# Patient Record
Sex: FEMALE | Race: WHITE | NOT HISPANIC OR LATINO | Employment: FULL TIME
[De-identification: names, ages, dates, MRNs, and addresses within clinical notes are randomized per-mention and may not be internally consistent; named-entity substitution may affect disease eponyms.]

---

## 2017-01-27 ENCOUNTER — HEALTH MAINTENANCE LETTER (OUTPATIENT)
Age: 62
End: 2017-01-27

## 2017-03-06 ENCOUNTER — TRANSFERRED RECORDS (OUTPATIENT)
Dept: FAMILY MEDICINE | Facility: CLINIC | Age: 62
End: 2017-03-06

## 2017-03-06 LAB
CHOLEST SERPL-MCNC: 186 MG/DL
HDLC SERPL-MCNC: 70 MG/DL
LDLC SERPL CALC-MCNC: 100 MG/DL
TRIGL SERPL-MCNC: 63 MG/DL

## 2017-03-16 ENCOUNTER — OFFICE VISIT (OUTPATIENT)
Dept: FAMILY MEDICINE | Facility: CLINIC | Age: 62
End: 2017-03-16

## 2017-03-16 VITALS
SYSTOLIC BLOOD PRESSURE: 120 MMHG | HEART RATE: 76 BPM | RESPIRATION RATE: 16 BRPM | WEIGHT: 191 LBS | DIASTOLIC BLOOD PRESSURE: 80 MMHG | TEMPERATURE: 98 F | BODY MASS INDEX: 29.98 KG/M2 | HEIGHT: 67 IN | OXYGEN SATURATION: 99 %

## 2017-03-16 DIAGNOSIS — Z71.89 ACP (ADVANCE CARE PLANNING): ICD-10-CM

## 2017-03-16 DIAGNOSIS — Z01.419 ENCOUNTER FOR GYNECOLOGICAL EXAMINATION WITHOUT ABNORMAL FINDING: ICD-10-CM

## 2017-03-16 LAB — HEMOGLOBIN: 14.2 G/DL (ref 11.7–15.7)

## 2017-03-16 PROCEDURE — 99396 PREV VISIT EST AGE 40-64: CPT | Performed by: FAMILY MEDICINE

## 2017-03-16 PROCEDURE — 85018 HEMOGLOBIN: CPT | Performed by: FAMILY MEDICINE

## 2017-03-16 PROCEDURE — 36415 COLL VENOUS BLD VENIPUNCTURE: CPT | Performed by: FAMILY MEDICINE

## 2017-03-16 PROCEDURE — 88142 CYTOPATH C/V THIN LAYER: CPT | Mod: 90 | Performed by: FAMILY MEDICINE

## 2017-03-16 NOTE — PATIENT INSTRUCTIONS
Total calcium intake of 1500 mgm/day, vitamin D 400-800IU/day and a regular weight bearing exercise program for prevention of osteoporosis is recommended treatment at this time.      HGB 14.2 ( nl 11.5-16.5)

## 2017-03-16 NOTE — NURSING NOTE
Patient is here for a full physical exam and pap.  Pre-Visit Screening :  Immunizations : up to date    Colonoscopy : is up to date  Mammogram : is up to date  Asthma Action Plan/Test : na  PHQ9/GAD7 : na  Pulse - regular  Medication Reconciliation: complete    BP done on the right arm, with a lg sized cuff.  Pulse - regular  My Chart - accepts    CLASSIFICATION OF OVERWEIGHT AND OBESITY BY BMI                         Obesity Class           BMI(kg/m2)  Underweight                                    < 18.5  Normal                                         18.5-24.9  Overweight                                     25.0-29.9  OBESITY                     I                  30.0-34.9                              II                 35.0-39.9  EXTREME OBESITY             III                >40                             Patient's  BMI Body mass index is 29.91 kg/(m^2).  http://hin.nhlbi.nih.gov/menuplanner/menu.cgi  Questioned patient about current smoking habits.  Pt. has never smoked.  ETOH screening:  Questions:  1-How often do you have a drink containing alcohol?                             1 times per week(s)  2-How many drinks containing alcohol do you have on a typical day when you are         Drinking?                              1   3- How often do you have 5 or more drinks on one occasion?                              never per never    Have you ever:  @None of the patient's responses to the CAGE screening were positive / Negative CAGE score@

## 2017-03-16 NOTE — MR AVS SNAPSHOT
After Visit Summary   3/16/2017    Paulette Grififn    MRN: [id number]           Patient Information     Date Of Birth          1955        Visit Information        Provider Department      3/16/2017 9:00 AM Dora Alas MD [facility]        Today's Diagnoses     Encounter for gynecological examination without abnormal finding        ACP (advance care planning)          Care Instructions    Total calcium intake of 1500 mgm/day, vitamin D 400-800IU/day and a regular weight bearing exercise program for prevention of osteoporosis is recommended treatment at this time.      HGB 14.2 ( nl 11.5-16.5)        Follow-ups after your visit        Follow-up notes from your care team     Return in about 1 year (around 3/16/2018), or if symptoms worsen or fail to improve.      Who to contact     If you have questions or need follow up information about today's clinic visit or your schedule please contact [facility] directly at [phone].  Normal or non-critical lab and imaging results will be communicated to you by Skyriderhart, letter or phone within 4 business days after the clinic has received the results. If you do not hear from us within 7 days, please contact the clinic through LetsCramt or phone. If you have a critical or abnormal lab result, we will notify you by phone as soon as possible.  Submit refill requests through "Showell - The Simple, Fast and Elegant Tablet Sales App" or call your pharmacy and they will forward the refill request to us. Please allow 3 business days for your refill to be completed.          Additional Information About Your Visit        MyChart Information     "Showell - The Simple, Fast and Elegant Tablet Sales App" gives you secure access to your electronic health record. If you see a primary care provider, you can also send messages to your care team and make appointments. If you have questions, please call your primary care clinic.  If you do not have a primary care provider, please call [phone] and they will assist  "you.        Care EveryWhere ID     This is your Care EveryWhere ID. This could be used by other organizations to access your [place] medical records  [id number]        Your Vitals Were     Pulse Temperature Respirations Height Pulse Oximetry BMI (Body Mass Index)    76 98  F (36.7  C) (Oral) 16 1.702 m (5' 7\") 99% 29.91 kg/m2       Blood Pressure from Last 3 Encounters:   03/16/17 120/80   11/20/14 110/70   10/10/12 130/86    Weight from Last 3 Encounters:   03/16/17 86.6 kg (191 lb)   11/20/14 86.2 kg (190 lb)   10/10/12 82.6 kg (182 lb)              We Performed the Following     CL AFF HEMOGLOBIN (BFP)     ThinPrep Pap and HPV (mRNA E6/E7){HPV-REFLEX} ([facility])     VENOUS COLLECTION        Primary Care Provider Office Phone # Fax #    Dora Alas [phone]       [facility] [address]  [facility] [address]        Thank you!     Thank you for choosing [facility]  for your care. Our goal is always to provide you with excellent care. Hearing back from our patients is one way we can continue to improve our services. Please take a few minutes to complete the written survey that you may receive in the mail after your visit with us. Thank you!             Your Updated Medication List - Protect others around you: Learn how to safely use, store and throw away your medicines at www.disposemymeds.org.      Notice  As of 3/16/2017  9:53 AM    You have not been prescribed any medications.      "

## 2017-03-16 NOTE — PROGRESS NOTES
"SUBJECTIVE:  Paulette Griffin is an 61 year old  postmenopausal woman   who presents for annual gyn exam. Menopause at age 57. No   bleeding, spotting, or discharge noted.     Estrogen replacement therapy: never  FROYLAN exposure: no  History of abnormal Pap smear: No  Family history of uterine or ovarian cancer: No  Regular self breast exam: Yes  History of abnormal mammogram: No  Family history of breast cancer: No  History of abnormal lipids: No    No past medical history on file.      Family History    Alzheimer Disease No family hx of     CANCER Maternal Aunt     Comment: bone    CANCER Paternal Aunt     Comment: primary site unknown    DIABETES Paternal Uncle     DIABETES Paternal Aunt     HEART DISEASE Father     Comment: cabg x 4         Past Surgical History    HC REMOVAL OF TONSILS,<11 Y/O      Comment Tonsillectomy, under 12 yrs       No current outpatient prescriptions on file.No current facility-administered medications for this visit.    -- No Known Allergies        Smoking status: Never Smoker    Smokeless tobacco: Never Used    Alcohol use Yes  0.6 oz/week    1 Glasses of wine per week            Review Of Systems  Ears/Nose/Throat: cold sores  Respiratory: No shortness of breath, dyspnea on exertion, cough, or hemoptysis  Cardiovascular: negative  Gastrointestinal: colonoscopy WNL  Genitourinary: negative    OBJECTIVE:  /80 (BP Location: Right arm, Patient Position: Chair, Cuff Size: Adult Large)  Pulse 76  Temp 98  F (36.7  C) (Oral)  Resp 16  Ht 1.702 m (5' 7\")  Wt 86.6 kg (191 lb)  SpO2 99%  BMI 29.91 kg/m2  General appearance: healthy, alert, no distress, cooperative and smiling  Skin: Skin color, texture, turgor normal. No rashes or lesions.  Ears: negative  Nose/Sinuses: Nares normal. Septum midline. Mucosa normal. No drainage or sinus tenderness.  Oropharynx: Lips, mucosa, and tongue normal. Teeth and gums normal.  Neck: Neck supple. No adenopathy. Thyroid symmetric, normal size,, " Carotids without bruits.  Lungs: negative, Percussion normal. Good diaphragmatic excursion. Lungs clear  Heart: negative, PMI normal. No lifts, heaves, or thrills. RRR. No murmurs, clicks gallops or rub  Breasts: Inspection negative. No nipple discharge or bleeding. No masses.  Abdomen: Abdomen soft, non-tender. BS normal. No masses, organomegaly, positive findings: obese  Pelvic: External genitalia and vagina normal. Bimanual and rectovaginal normal., positive findings:  vaginal mucosa atrophy  BMI : Body mass index is 29.91 kg/(m^2).    ASSESSMENT:(Z01.419) Encounter for gynecological examination without abnormal finding  Plan: ThinPrep Pap and HPV (mRNA E6/E7)         ([facility]), CL AFF HEMOGLOBIN (BFP), VENOUS         COLLECTION        Total calcium intake of 1500 mgm/day, vitamin D 400-800IU/day and a regular weight bearing exercise program for prevention of osteoporosis is recommended treatment at this time.      (Z71.89) ACP (advance care planning)  Plan: I have reviewed the patient's medical history in detail and updated the computerized patient record.          Dx:  1)  Pap smear, mammogram  2)  Lipids at appropriate intervals    PE:  Reviewed health maintenance including diet, regular exercise,   estrogen replacement and periodic exams.

## 2017-03-21 LAB
CLINICAL HISTORY - QUEST: NORMAL
CYTOTECHNOLOGIST - QUEST: NORMAL
DESCRIPTIVE DIAGNOSIS - QUEST: NORMAL
INFECTION - QUEST: NORMAL
LAST PAP DX - QUEST: NORMAL
LMP - QUEST: NORMAL
PREV BX DX - QUEST: NORMAL
SOURCE: NORMAL
STATEMENT OF ADEQUACY - QUEST: NORMAL

## 2019-12-08 ENCOUNTER — HEALTH MAINTENANCE LETTER (OUTPATIENT)
Age: 64
End: 2019-12-08

## 2020-01-10 ENCOUNTER — TRANSFERRED RECORDS (OUTPATIENT)
Dept: FAMILY MEDICINE | Facility: CLINIC | Age: 65
End: 2020-01-10

## 2020-01-10 LAB — MAMMOGRAM: NORMAL

## 2020-07-29 ENCOUNTER — TELEPHONE (OUTPATIENT)
Dept: FAMILY MEDICINE | Facility: CLINIC | Age: 65
End: 2020-07-29

## 2020-07-29 NOTE — TELEPHONE ENCOUNTER
Patient called in and left a message stating that she got a bee sting on Monday and the swelling and symptoms are starting to become worse now. She had iced it, taken benadryl and used hydrocortisone cream on it for the itching and everything is starting to become worse so she is wondering what she should do. I called her back and informed her that it might be best to come in for an office visit to have this taken look at because things should start to become better rather then worse with this. She expressed understanding and stated that the swelling is starting to spread to other parts as well so she does want to come in for a visit. I transferred her to  to get scheduled since it has been a couple years since she has been seen.

## 2021-01-09 ENCOUNTER — HEALTH MAINTENANCE LETTER (OUTPATIENT)
Age: 66
End: 2021-01-09

## 2021-02-11 ENCOUNTER — TRANSFERRED RECORDS (OUTPATIENT)
Dept: FAMILY MEDICINE | Facility: CLINIC | Age: 66
End: 2021-02-11

## 2021-02-11 LAB — MAMMOGRAM: NORMAL

## 2021-03-05 ENCOUNTER — OFFICE VISIT (OUTPATIENT)
Dept: FAMILY MEDICINE | Facility: CLINIC | Age: 66
End: 2021-03-05

## 2021-03-05 VITALS
DIASTOLIC BLOOD PRESSURE: 82 MMHG | SYSTOLIC BLOOD PRESSURE: 126 MMHG | OXYGEN SATURATION: 99 % | WEIGHT: 175.4 LBS | TEMPERATURE: 98.1 F | HEART RATE: 82 BPM | HEIGHT: 67 IN | BODY MASS INDEX: 27.53 KG/M2 | RESPIRATION RATE: 20 BRPM

## 2021-03-05 DIAGNOSIS — Z12.4 SCREENING FOR CERVICAL CANCER: ICD-10-CM

## 2021-03-05 DIAGNOSIS — Z01.419 ENCOUNTER FOR GYNECOLOGICAL EXAMINATION WITHOUT ABNORMAL FINDING: Primary | ICD-10-CM

## 2021-03-05 DIAGNOSIS — Z12.11 SPECIAL SCREENING FOR MALIGNANT NEOPLASMS, COLON: ICD-10-CM

## 2021-03-05 DIAGNOSIS — Z13.1 SCREENING FOR DIABETES MELLITUS: ICD-10-CM

## 2021-03-05 DIAGNOSIS — Z13.220 SCREENING CHOLESTEROL LEVEL: ICD-10-CM

## 2021-03-05 LAB
CHOLEST SERPL-MCNC: 216 MG/DL (ref 0–199)
CHOLEST/HDLC SERPL: 3 {RATIO} (ref 0–5)
GLUCOSE SERPL-MCNC: 81 MG/DL (ref 60–99)
HDLC SERPL-MCNC: 80 MG/DL (ref 40–150)
HEMOGLOBIN: 14.7 G/DL (ref 11.7–15.7)
LDLC SERPL CALC-MCNC: 124 MG/DL (ref 0–130)
TRIGL SERPL-MCNC: 62 MG/DL (ref 0–149)

## 2021-03-05 PROCEDURE — 36415 COLL VENOUS BLD VENIPUNCTURE: CPT | Performed by: FAMILY MEDICINE

## 2021-03-05 PROCEDURE — 88142 CYTOPATH C/V THIN LAYER: CPT | Mod: 90 | Performed by: FAMILY MEDICINE

## 2021-03-05 PROCEDURE — 99396 PREV VISIT EST AGE 40-64: CPT | Performed by: FAMILY MEDICINE

## 2021-03-05 PROCEDURE — 80061 LIPID PANEL: CPT | Performed by: FAMILY MEDICINE

## 2021-03-05 PROCEDURE — 85018 HEMOGLOBIN: CPT | Performed by: FAMILY MEDICINE

## 2021-03-05 PROCEDURE — 82947 ASSAY GLUCOSE BLOOD QUANT: CPT | Performed by: FAMILY MEDICINE

## 2021-03-05 RX ORDER — MULTIPLE VITAMINS W/ MINERALS TAB 9MG-400MCG
1 TAB ORAL DAILY
COMMUNITY

## 2021-03-05 SDOH — ECONOMIC STABILITY: TRANSPORTATION INSECURITY
IN THE PAST 12 MONTHS, HAS LACK OF TRANSPORTATION KEPT YOU FROM MEETINGS, WORK, OR FROM GETTING THINGS NEEDED FOR DAILY LIVING?: NO

## 2021-03-05 SDOH — ECONOMIC STABILITY: TRANSPORTATION INSECURITY
IN THE PAST 12 MONTHS, HAS THE LACK OF TRANSPORTATION KEPT YOU FROM MEDICAL APPOINTMENTS OR FROM GETTING MEDICATIONS?: NO

## 2021-03-05 SDOH — HEALTH STABILITY: MENTAL HEALTH: HOW OFTEN DO YOU HAVE A DRINK CONTAINING ALCOHOL?: 2-4 TIMES A MONTH

## 2021-03-05 SDOH — ECONOMIC STABILITY: FOOD INSECURITY: WITHIN THE PAST 12 MONTHS, THE FOOD YOU BOUGHT JUST DIDN'T LAST AND YOU DIDN'T HAVE MONEY TO GET MORE.: NEVER TRUE

## 2021-03-05 SDOH — ECONOMIC STABILITY: INCOME INSECURITY: HOW HARD IS IT FOR YOU TO PAY FOR THE VERY BASICS LIKE FOOD, HOUSING, MEDICAL CARE, AND HEATING?: NOT HARD AT ALL

## 2021-03-05 SDOH — HEALTH STABILITY: MENTAL HEALTH: HOW MANY STANDARD DRINKS CONTAINING ALCOHOL DO YOU HAVE ON A TYPICAL DAY?: 1 OR 2

## 2021-03-05 SDOH — HEALTH STABILITY: MENTAL HEALTH: HOW OFTEN DO YOU HAVE 6 OR MORE DRINKS ON ONE OCCASION?: NEVER

## 2021-03-05 SDOH — ECONOMIC STABILITY: FOOD INSECURITY: WITHIN THE PAST 12 MONTHS, YOU WORRIED THAT YOUR FOOD WOULD RUN OUT BEFORE YOU GOT MONEY TO BUY MORE.: NEVER TRUE

## 2021-03-05 ASSESSMENT — MIFFLIN-ST. JEOR: SCORE: 1383.24

## 2021-03-05 NOTE — PROGRESS NOTES
SUBJECTIVE:  Paulette Griffin is an 65 year old  postmenopausal woman   who presents for annual gyn exam. Menopause at age 57. No   bleeding, spotting, or discharge noted.     Estrogen replacement therapy: never  FROYLAN exposure: no  History of abnormal Pap smear: No  Family history of uterine or ovarian cancer: No  Regular self breast exam: Yes  History of abnormal mammogram: No  Family history of breast cancer: No  History of abnormal lipids: No    No past medical history on file.    Review of patient's family history indicates:  Problem: Alzheimer Disease      Relation: No family hx of          Age of Onset: (Not Specified)  Problem: Cancer      Relation: Maternal Aunt          Age of Onset: (Not Specified)          Comment: bone  Problem: Cancer      Relation: Paternal Aunt          Age of Onset: (Not Specified)          Comment: primary site unknown  Problem: Diabetes      Relation: Paternal Uncle          Age of Onset: (Not Specified)  Problem: Diabetes      Relation: Paternal Aunt          Age of Onset: (Not Specified)  Problem: Heart Disease      Relation: Father          Age of Onset: (Not Specified)          Comment: cabg x 4      Past Surgical History:   Procedure Laterality Date     HC REMOVAL OF TONSILS,<13 Y/O      Tonsillectomy, under 12 yrs     HYSTEROSCOPY  2015    polyp         Current Outpatient Medications:   multivitamin w/minerals (MULTI-VITAMIN) tablet    No current facility-administered medications for this visit.      -- No Known Allergies     Social History    Tobacco Use      Smoking status: Former Smoker      Smokeless tobacco: Never Used      Tobacco comment: teenager    Alcohol use: Yes      Alcohol/week: 1.0 standard drinks      Types: 1 Glasses of wine per week      Frequency: 2-4 times a month      Drinks per session: 1 or 2      Binge frequency: Never      Review Of Systems  Ears/Nose/Throat: cold sores  Respiratory: No shortness of breath, dyspnea on exertion, cough, or  "hemoptysis  Cardiovascular: negative  Gastrointestinal: 2009 colonoscopy/ due  Genitourinary: negative    OBJECTIVE:  /82 (BP Location: Right arm, Patient Position: Sitting, Cuff Size: Adult Large)   Pulse 82   Temp 98.1  F (36.7  C) (Oral)   Ht 1.702 m (5' 7\")   Wt 79.6 kg (175 lb 6.4 oz)   LMP 08/31/2012   SpO2 99%   BMI 27.47 kg/m    General appearance: healthy, alert, no distress, cooperative and smiling  Skin: Skin color, texture, turgor normal. No rashes or lesions.  Ears: negative  Nose/Sinuses: Nares normal. Septum midline. Mucosa normal. No drainage or sinus tenderness.  Oropharynx: Lips, mucosa, and tongue normal. Teeth and gums normal.  Neck: Neck supple. No adenopathy. Thyroid symmetric, normal size,, Carotids without bruits.  Lungs: negative, Percussion normal. Good diaphragmatic excursion. Lungs clear  Heart: negative, PMI normal. No lifts, heaves, or thrills. RRR. No murmurs, clicks gallops or rub  Breasts: Inspection negative. No nipple discharge or bleeding. No masses.  Abdomen: Abdomen soft, non-tender. BS normal. No masses, organomegaly  Pelvic: External genitalia and vagina normal. Bimanual and rectovaginal normal., positive findings:  vaginal mucosa atrophy, cervical polyp  BMI : Body mass index is 27.47 kg/m .    ASSESSMENT:  (Z01.419) Encounter for gynecological examination without abnormal finding  (primary encounter diagnosis)  Plan: ThinPrep Pap and HPV (mRNA E6/E7)         ([facility]), HEMOGLOBIN (BFP), VENOUS COLLECTION        Colonoscopy recommended  Exercise encouraged  Fasting lipid profile obtained  Follow up in 1 year  Glucose obtained  Hemoglobin obtained  PAP smear obtained  Routine breast self-exam encouraged  Seat belt use encouraged  Sunscreen recommended  Shingles vaccine      (Z12.4) Screening for cervical cancer  Plan: ThinPrep Pap and HPV (mRNA E6/E7)         ([facility])            (Z13.1) Screening for diabetes mellitus  Plan: Glucose Fasting (BFP), VENOUS COLLECTION   "          (Z13.220) Screening cholesterol level:   Plan: Lipid Panel (BFP), VENOUS COLLECTION           (Z12.11) Special screening for malignant neoplasms, colon  Plan: GASTROENTEROLOGY ADULT REF PROCEDURE ONLY,         CANCELED: GASTROENTEROLOGY ADULT REF PROCEDURE         ONLY              Dx:  1)  Pap smear, mammogram  2)  Lipids at appropriate intervals    PE:  Reviewed health maintenance including diet, regular exercise,   estrogen replacement and periodic exams.

## 2021-03-05 NOTE — PATIENT INSTRUCTIONS
Colonoscopy recommended  Exercise encouraged  Fasting lipid profile obtained  Follow up in 1 year  Glucose obtained  Hemoglobin obtained  PAP smear obtained  Routine breast self-exam encouraged  Seat belt use encouraged  Sunscreen recommended  Shingles vaccine

## 2021-03-05 NOTE — NURSING NOTE
Patient is here for a full physical exam.    Pre-Visit Screening :  Immunizations : not up to date- shingrix at pharmacy  Colon Screening : is up to date/ cologaurd done last year  Mammogram: UTD  Asthma Action Test/Plan : NA  PHQ9 :  NA  GAD7 :  NA  Patient's  BMI Body mass index is 27.47 kg/m .  Questioned patient about current smoking habits.  Pt. has smoked years ago.  OK to leave a detailed voice message regarding today's visit Yes, phone # [phone]      ETOH screening: addressed in history

## 2021-03-10 LAB
CLINICAL HISTORY - QUEST: NORMAL
COMMENT - QUEST: NORMAL
CYTOTECHNOLOGIST - QUEST: NORMAL
DESCRIPTIVE DIAGNOSIS - QUEST: NORMAL
LAST PAP DX - QUEST: NORMAL
LMP - QUEST: NORMAL
PREV BX DX - QUEST: NORMAL
SOURCE: NORMAL
STATEMENT OF ADEQUACY - QUEST: NORMAL

## 2021-04-05 ENCOUNTER — IMMUNIZATION (OUTPATIENT)
Dept: NURSING | Facility: CLINIC | Age: 66
End: 2021-04-05
Payer: COMMERCIAL

## 2021-04-05 PROCEDURE — 0001A PR COVID VAC PFIZER DIL RECON 30 MCG/0.3 ML IM: CPT

## 2021-04-05 PROCEDURE — 91300 PR COVID VAC PFIZER DIL RECON 30 MCG/0.3 ML IM: CPT

## 2021-04-26 ENCOUNTER — IMMUNIZATION (OUTPATIENT)
Dept: NURSING | Facility: CLINIC | Age: 66
End: 2021-04-26
Attending: INTERNAL MEDICINE
Payer: COMMERCIAL

## 2021-04-26 PROCEDURE — 0002A PR COVID VAC PFIZER DIL RECON 30 MCG/0.3 ML IM: CPT

## 2021-04-26 PROCEDURE — 91300 PR COVID VAC PFIZER DIL RECON 30 MCG/0.3 ML IM: CPT

## 2021-07-15 ENCOUNTER — TRANSFERRED RECORDS (OUTPATIENT)
Dept: FAMILY MEDICINE | Facility: CLINIC | Age: 66
End: 2021-07-15

## 2021-09-10 ENCOUNTER — MYC MEDICAL ADVICE (OUTPATIENT)
Dept: FAMILY MEDICINE | Facility: CLINIC | Age: 66
End: 2021-09-10

## 2021-10-23 ENCOUNTER — HEALTH MAINTENANCE LETTER (OUTPATIENT)
Age: 66
End: 2021-10-23

## 2021-11-02 ENCOUNTER — OFFICE VISIT (OUTPATIENT)
Dept: FAMILY MEDICINE | Facility: CLINIC | Age: 66
End: 2021-11-02

## 2021-11-02 VITALS
HEIGHT: 67 IN | OXYGEN SATURATION: 98 % | SYSTOLIC BLOOD PRESSURE: 130 MMHG | DIASTOLIC BLOOD PRESSURE: 82 MMHG | BODY MASS INDEX: 29.29 KG/M2 | TEMPERATURE: 98.5 F | HEART RATE: 71 BPM | WEIGHT: 186.6 LBS

## 2021-11-02 DIAGNOSIS — Z83.2 FAMILY HISTORY OF CLOTTING DISORDER: ICD-10-CM

## 2021-11-02 DIAGNOSIS — Z01.818 PREOPERATIVE EXAMINATION: ICD-10-CM

## 2021-11-02 DIAGNOSIS — R94.31 ABNORMAL ELECTROCARDIOGRAM: ICD-10-CM

## 2021-11-02 DIAGNOSIS — M25.552 HIP PAIN, LEFT: Primary | ICD-10-CM

## 2021-11-02 LAB
% GRANULOCYTES: 62.7 %
BUN SERPL-MCNC: 15 MG/DL (ref 7–25)
BUN/CREATININE RATIO: 20 (ref 6–22)
CALCIUM SERPL-MCNC: 10 MG/DL (ref 8.6–10.3)
CHLORIDE SERPLBLD-SCNC: 100.6 MMOL/L (ref 98–110)
CO2 SERPL-SCNC: 31.4 MMOL/L (ref 20–32)
CREAT SERPL-MCNC: 0.75 MG/DL (ref 0.6–1.3)
GLUCOSE SERPL-MCNC: 97 MG/DL (ref 60–99)
HCT VFR BLD AUTO: 44.6 % (ref 35–47)
HEMOGLOBIN: 14.8 G/DL (ref 11.7–15.7)
LYMPHOCYTES NFR BLD AUTO: 30.1 %
MCH RBC QN AUTO: 33 PG (ref 26–33)
MCHC RBC AUTO-ENTMCNC: 33.2 G/DL (ref 31–36)
MCV RBC AUTO: 99.5 FL (ref 78–100)
MONOCYTES NFR BLD AUTO: 7.2 %
PLATELET COUNT - QUEST: 325 10^9/L (ref 150–375)
POTASSIUM SERPL-SCNC: 4.44 MMOL/L (ref 3.5–5.3)
RBC # BLD AUTO: 4.48 10*12/L (ref 3.8–5.2)
SODIUM SERPL-SCNC: 137.7 MMOL/L (ref 135–146)
WBC # BLD AUTO: 6.4 10*9/L (ref 4–11)

## 2021-11-02 PROCEDURE — 85025 COMPLETE CBC W/AUTO DIFF WBC: CPT | Performed by: FAMILY MEDICINE

## 2021-11-02 PROCEDURE — 99215 OFFICE O/P EST HI 40 MIN: CPT | Mod: 25 | Performed by: FAMILY MEDICINE

## 2021-11-02 PROCEDURE — 93000 ELECTROCARDIOGRAM COMPLETE: CPT | Performed by: FAMILY MEDICINE

## 2021-11-02 PROCEDURE — 36415 COLL VENOUS BLD VENIPUNCTURE: CPT | Performed by: FAMILY MEDICINE

## 2021-11-02 PROCEDURE — 80048 BASIC METABOLIC PNL TOTAL CA: CPT | Performed by: FAMILY MEDICINE

## 2021-11-02 ASSESSMENT — MIFFLIN-ST. JEOR: SCORE: 1429.04

## 2021-11-02 NOTE — NURSING NOTE
Chief Complaint   Patient presents with     Pre-Op Exam     left hip replacement on 11/23     Pre-visit Screening:  Immunizations:  up to date  Colonoscopy:  is up to date  Mammogram: is up to date  Asthma Action Test/Plan:  NA  PHQ9:  NA  GAD7:  NA  Questioned patient about current smoking habits Pt. quit smoking some time ago.  Ok to leave detailed message on voice mail for today's visit only Yes, phone # [phone]

## 2021-11-03 ENCOUNTER — DOCUMENTATION ONLY (OUTPATIENT)
Dept: CARDIOLOGY | Facility: CLINIC | Age: 66
End: 2021-11-03

## 2021-11-03 NOTE — PROGRESS NOTES
Phone consult for abnormal ECG by Dr. Gracia. Pt is having pre-op evaluation prior to hip surgery.   I reviewed ECG. SR with LBBB. This is new. No previous ECG in record.  Multiple cardiac risk factors: smoking history, elevated cholesterol, family history of CAD.  I recommended Lexiscan nuclear perfusion study for further evaluation and cardiology consult if abnormal.     Cristofer Ortega MD

## 2021-11-05 ENCOUNTER — HOSPITAL ENCOUNTER (OUTPATIENT)
Dept: NUCLEAR MEDICINE | Facility: CLINIC | Age: 66
Setting detail: NUCLEAR MEDICINE
End: 2021-11-05
Attending: FAMILY MEDICINE
Payer: COMMERCIAL

## 2021-11-05 ENCOUNTER — HOSPITAL ENCOUNTER (OUTPATIENT)
Dept: CARDIOLOGY | Facility: CLINIC | Age: 66
End: 2021-11-05
Attending: FAMILY MEDICINE
Payer: COMMERCIAL

## 2021-11-05 DIAGNOSIS — M25.552 HIP PAIN, LEFT: ICD-10-CM

## 2021-11-05 DIAGNOSIS — Z01.818 PREOPERATIVE EXAMINATION: ICD-10-CM

## 2021-11-05 DIAGNOSIS — R94.31 ABNORMAL ELECTROCARDIOGRAM: ICD-10-CM

## 2021-11-05 LAB
STRESS ECHO BASELINE DIASTOLIC HE: 99
STRESS ECHO BASELINE HR: 72 BPM
STRESS ECHO BASELINE SYSTOLIC BP: 172
STRESS ECHO TARGET HR: 156
STRESS/REST PERFUSION RATIO: 1.03

## 2021-11-05 PROCEDURE — A9502 TC99M TETROFOSMIN: HCPCS | Performed by: FAMILY MEDICINE

## 2021-11-05 PROCEDURE — 250N000011 HC RX IP 250 OP 636

## 2021-11-05 PROCEDURE — 343N000001 HC RX 343: Performed by: FAMILY MEDICINE

## 2021-11-05 PROCEDURE — 93018 CV STRESS TEST I&R ONLY: CPT | Performed by: INTERNAL MEDICINE

## 2021-11-05 PROCEDURE — 93016 CV STRESS TEST SUPVJ ONLY: CPT | Performed by: INTERNAL MEDICINE

## 2021-11-05 PROCEDURE — 78452 HT MUSCLE IMAGE SPECT MULT: CPT | Mod: 26 | Performed by: INTERNAL MEDICINE

## 2021-11-05 PROCEDURE — 78452 HT MUSCLE IMAGE SPECT MULT: CPT

## 2021-11-05 PROCEDURE — 93017 CV STRESS TEST TRACING ONLY: CPT

## 2021-11-05 RX ORDER — REGADENOSON 0.08 MG/ML
0.4 INJECTION, SOLUTION INTRAVENOUS ONCE
Status: COMPLETED | OUTPATIENT
Start: 2021-11-05 | End: 2021-11-05

## 2021-11-05 RX ORDER — REGADENOSON 0.08 MG/ML
INJECTION, SOLUTION INTRAVENOUS
Status: COMPLETED
Start: 2021-11-05 | End: 2021-11-05

## 2021-11-05 RX ORDER — ACYCLOVIR 200 MG/1
0-1 CAPSULE ORAL
Status: DISCONTINUED | OUTPATIENT
Start: 2021-11-05 | End: 2021-11-06 | Stop reason: HOSPADM

## 2021-11-05 RX ORDER — ALBUTEROL SULFATE 90 UG/1
2 AEROSOL, METERED RESPIRATORY (INHALATION) EVERY 5 MIN PRN
Status: DISCONTINUED | OUTPATIENT
Start: 2021-11-05 | End: 2021-11-06 | Stop reason: HOSPADM

## 2021-11-05 RX ORDER — CAFFEINE CITRATE 20 MG/ML
60 SOLUTION INTRAVENOUS
Status: DISCONTINUED | OUTPATIENT
Start: 2021-11-05 | End: 2021-11-06 | Stop reason: HOSPADM

## 2021-11-05 RX ORDER — AMINOPHYLLINE 25 MG/ML
INJECTION, SOLUTION INTRAVENOUS
Status: DISCONTINUED
Start: 2021-11-05 | End: 2021-11-05 | Stop reason: WASHOUT

## 2021-11-05 RX ORDER — AMINOPHYLLINE 25 MG/ML
50-100 INJECTION, SOLUTION INTRAVENOUS
Status: DISCONTINUED | OUTPATIENT
Start: 2021-11-05 | End: 2021-11-06 | Stop reason: HOSPADM

## 2021-11-05 RX ADMIN — REGADENOSON 0.4 MG: 0.08 INJECTION, SOLUTION INTRAVENOUS at 14:31

## 2021-11-05 RX ADMIN — TETROFOSMIN 10.3 MCI.: 1.38 INJECTION, POWDER, LYOPHILIZED, FOR SOLUTION INTRAVENOUS at 13:03

## 2021-11-05 RX ADMIN — TETROFOSMIN 31 MCI.: 1.38 INJECTION, POWDER, LYOPHILIZED, FOR SOLUTION INTRAVENOUS at 14:23

## 2021-11-08 ENCOUNTER — TELEPHONE (OUTPATIENT)
Dept: FAMILY MEDICINE | Facility: CLINIC | Age: 66
End: 2021-11-08

## 2021-11-08 ENCOUNTER — TRANSFERRED RECORDS (OUTPATIENT)
Dept: FAMILY MEDICINE | Facility: CLINIC | Age: 66
End: 2021-11-08

## 2021-11-08 NOTE — TELEPHONE ENCOUNTER
I left a message for patient with referral information regarding Cardiology Consult Referral. I left info for patient to call to make her appointment. Will also send a MY CHART message with info

## 2021-11-08 NOTE — TELEPHONE ENCOUNTER
Patient called back with her cardiology appointment.     11/16/2021 @ 8:00am with     Dr. Sarah Alford   [facility]   [facility]  [address]  [place], [place] [address] [phone] -- appt line  [phone] -- fax    Patient asked if orthopedic has been informed of appointments. I advised her to call orthopedic to give update/heads up so they are up to date with status.     BETH

## 2021-11-16 ENCOUNTER — OFFICE VISIT (OUTPATIENT)
Dept: CARDIOLOGY | Facility: CLINIC | Age: 66
End: 2021-11-16
Attending: FAMILY MEDICINE
Payer: COMMERCIAL

## 2021-11-16 VITALS
HEART RATE: 89 BPM | WEIGHT: 184.5 LBS | OXYGEN SATURATION: 99 % | DIASTOLIC BLOOD PRESSURE: 88 MMHG | BODY MASS INDEX: 28.96 KG/M2 | SYSTOLIC BLOOD PRESSURE: 144 MMHG | HEIGHT: 67 IN

## 2021-11-16 DIAGNOSIS — Z01.818 PREOPERATIVE EXAMINATION: ICD-10-CM

## 2021-11-16 DIAGNOSIS — R94.31 ABNORMAL ELECTROCARDIOGRAM: ICD-10-CM

## 2021-11-16 PROCEDURE — 99204 OFFICE O/P NEW MOD 45 MIN: CPT | Performed by: INTERNAL MEDICINE

## 2021-11-16 RX ORDER — ACETAMINOPHEN 500 MG
500-1000 TABLET ORAL EVERY 6 HOURS PRN
COMMUNITY

## 2021-11-16 ASSESSMENT — MIFFLIN-ST. JEOR: SCORE: 1419.52

## 2021-11-16 NOTE — PATIENT INSTRUCTIONS
1. Take aspirin post-op as per hematology recommendations.  2. No additional cardiovascular testing recommended.  3. Proceed with left hip arthoplasty as planned  4. Follow up approximately one month post-op with Anyi Gracia to evaluate blood pressure.

## 2021-11-16 NOTE — PROGRESS NOTES
CARDIOLOGY CLINIC CONSULT    REASON FOR CONSULT: [facility]    PRIMARY CARE PHYSICIAN:  Anyi Gracia    HISTORY OF PRESENT ILLNESS:  Paulette Griffin is a very nice 66-year-old woman here for preoperative cardiovascular evaluation to review her stress test and EKG results prior to planned left hip arthroplasty next week.  She has no personal history of heart attack or stroke and is not typically hypertensive.    She is typically very active and walks 5 miles per day up until about a month ago. Over the past 30 days the hip pain has really worsened and she can't do her usual 30-45 min per day of aerobic exercise.     Her preoperative physical EKG was notable for a left bundle branch block.  She is asymptomatic and has no history of abnormal chest pain, dyspnea, palpitations or edema.    Ivory was very anxious and actually tearful at the beginning of her visit today.  She has very limiting hip pain and was worried about the need for cardiovascular testing.  We reviewed her tests in detail.  Her stress test result is reassuring with a distal abnormality consistent with left bundle branch block and probably no ischemia.     She was referred to oncology due to history of blood clots in her family.  Her mother and her daughter both had blood clots while on birth control.  She was advised to take aspirin after the hip arthroplasty.    Her blood pressure was elevated today and when she presented for her stress test.  She states that typically her blood pressure runs 120s to 130s over 60s to 70s.  I reviewed her chart and indeed her blood pressure does not typically run in the hypertensive range where we would recommend medication therapy.  Her blood pressure was improved at the end of our visit today.    PAST MEDICAL HISTORY:  Left hip arthritis    MEDICATIONS:  Current Outpatient Medications   Medication     acetaminophen (TYLENOL) 500 MG tablet     Homeopathic Products (AVOCADO REVITALIZING EX)     MELATONIN PO      multivitamin w/minerals (MULTI-VITAMIN) tablet     TURMERIC PO     Naproxen Sodium (ALEVE PO)     No current facility-administered medications for this visit.       ALLERGIES:  Allergies   Allergen Reactions     No Known Allergies        SOCIAL HISTORY:  Quit smoking almost 40 yrs ago  No heavy EtOH    FAMILY HISTORY:  Her mother and daughter both had blood clots when they were on birth control.   Her dad has a heart history, he had CABG in his late 50s and is still living at age 86      REVIEW OF SYSTEMS:  Skin:  Negative     Eyes:  Negative    ENT:  Negative    Respiratory:  Negative    Cardiovascular:  Negative    Gastroenterology: Negative    Genitourinary:  Negative    Musculoskeletal:  Positive for joint pain  Neurologic:  Negative    Psychiatric:  Negative    Heme/Lymph/Imm:  Negative    Endocrine:  Negative      PHYSICAL EXAM:      BP: (!) 144/88 Pulse: 89     SpO2: 99 %      Vital Signs with Ranges  Pulse:  [89] 89  BP: (160)/(104) 160/104  SpO2:  [99 %] 99 %  184 lbs 8 oz    Constitutional: awake, alert, no distress  Eyes: sclera nonicteric  ENT: trachea midline  Respiratory: Clear to auscultation bilaterally  Cardiovascular: Regular rate and rhythm no murmur rub or gallop  GI: nondistended, nontender, bowel sounds present  Skin: dry, no rash no edema  Musculoskeletal: grossly normal muscle bulk and tone  Neuropsychiatric: appropriate affect      DATA:   LAST CHOLESTEROL:  Lab Results   Component Value Date    CHOL 216 03/05/2021     Lab Results   Component Value Date    HDL 80 03/05/2021     Lab Results   Component Value Date     03/05/2021     03/06/2017     Lab Results   Component Value Date    TRIG 62 03/05/2021     Lab Results   Component Value Date    CHOLHDLRATIO 3 03/05/2021       LAST BMP:  Lab Results   Component Value Date    .7 11/02/2021      Lab Results   Component Value Date    POTASSIUM 4.44 11/02/2021     Lab Results   Component Value Date    CHLORIDE 100.6 11/02/2021  "    Lab Results   Component Value Date    RODRÍGUEZ 10.0 11/02/2021     Lab Results   Component Value Date    CO2 31.4 11/02/2021     Lab Results   Component Value Date    BUN 15 11/02/2021    BUN 20.0 11/02/2021     Lab Results   Component Value Date    CR 0.75 11/02/2021     Lab Results   Component Value Date    GLC 97 11/02/2021       LAST CBC:  Lab Results   Component Value Date    WBC 6.4 11/02/2021     Lab Results   Component Value Date    RBC 4.48 11/02/2021     Lab Results   Component Value Date    HGB 14.8 11/02/2021     Lab Results   Component Value Date    HCT 44.6 11/02/2021     Lab Results   Component Value Date    MCV 99.5 11/02/2021     Lab Results   Component Value Date    MCH 33.0 11/02/2021     Lab Results   Component Value Date    MCHC 33.2 11/02/2021     No results found for: RDW  Lab Results   Component Value Date     11/02/2021         EKG 1/2/21 sinus w/lbbb    lexiscan stress test 11/5/21     The nuclear stress test is probably negative for inducible myocardial ischemia or infarction.     Nuclear Study Quality: Small, distal anteroseptal defect, suspect this is secondary to left bundle branch block.  Less likely would be small area of infarct.     The left ventricular ejection fraction at stress is greater than 70%.     There is no prior study for comparison.        ASSESSMENT:  1. Left bundle branch block.  2. Plan for left hip arthroplasty as planned.  She has low estimated cardiac risk for major adverse event for a moderate risk non-cardiac surgery.   3. Stress test negative for inducible ischemia (\"probably\" negative with lower level of certainty due to underlying LBBB).  4. Elevated blood pressure without history of hypertension  5. Family history of possible clotting disorder.    RECOMMENDATIONS:  1. She will take aspirin post-op per hematology  2. No additional cardiovascular testing recommended prior to surgery.  3. Proceed with left hip arthoplasty as planned  4. Follow up " approximately one month post-op with Anyi Gracia to evaluate blood pressure.  I do not recommend initiation of an antihypertensive agent/ beta-blocker at this time in the preoperative.  In the context of elevated blood pressure without a diagnosis of hypertension and with significant ongoing hip pain.  5. Continue routine primary care postoperatively.  Cardiology follow-up as needed.    Sarah Alford MD [facility]  Text Page

## 2021-11-16 NOTE — TELEPHONE ENCOUNTER
Patient called to let us know that she saw Cardiology this morning and was approved for surgery.     Dr. Gracia please review the cardiology note in Epic from today. Patient asked if you could call  [facility]/Dr. Mike so he knows that she is ok for surgery today.   Patient said that you can reach her @ [phone] with any questions.     Thank you

## 2021-11-16 NOTE — LETTER
11/16/2021    Anyi Gracia MD  [address]  [facility] [address]    RE: Paulette MARTINS Gaby       Dear Colleague,    I had the pleasure of seeing Paulette Griffin in the [facility].  CARDIOLOGY CLINIC CONSULT    REASON FOR CONSULT: [facility]    PRIMARY CARE PHYSICIAN:  Anyi Gracia    HISTORY OF PRESENT ILLNESS:  Paulette Griffin is a very nice 66-year-old woman here for preoperative cardiovascular evaluation to review her stress test and EKG results prior to planned left hip arthroplasty next week.  She has no personal history of heart attack or stroke and is not typically hypertensive.    She is typically very active and walks 5 miles per day up until about a month ago. Over the past 30 days the hip pain has really worsened and she can't do her usual 30-45 min per day of aerobic exercise.     Her preoperative physical EKG was notable for a left bundle branch block.  She is asymptomatic and has no history of abnormal chest pain, dyspnea, palpitations or edema.    Ivory was very anxious and actually tearful at the beginning of her visit today.  She has very limiting hip pain and was worried about the need for cardiovascular testing.  We reviewed her tests in detail.  Her stress test result is reassuring with a distal abnormality consistent with left bundle branch block and probably no ischemia.     She was referred to oncology due to history of blood clots in her family.  Her mother and her daughter both had blood clots while on birth control.  She was advised to take aspirin after the hip arthroplasty.    Her blood pressure was elevated today and when she presented for her stress test.  She states that typically her blood pressure runs 120s to 130s over 60s to 70s.  I reviewed her chart and indeed her blood pressure does not typically run in the hypertensive range where we would recommend medication therapy.  Her blood pressure was improved at the end of  our visit today.    PAST MEDICAL HISTORY:  Left hip arthritis    MEDICATIONS:  Current Outpatient Medications   Medication     acetaminophen (TYLENOL) 500 MG tablet     Homeopathic Products (AVOCADO REVITALIZING EX)     MELATONIN PO     multivitamin w/minerals (MULTI-VITAMIN) tablet     TURMERIC PO     Naproxen Sodium (ALEVE PO)     No current facility-administered medications for this visit.       ALLERGIES:  Allergies   Allergen Reactions     No Known Allergies        SOCIAL HISTORY:  Quit smoking almost 40 yrs ago  No heavy EtOH    FAMILY HISTORY:  Her mother and daughter both had blood clots when they were on birth control.   Her dad has a heart history, he had CABG in his late 50s and is still living at age 86      REVIEW OF SYSTEMS:  Skin:  Negative     Eyes:  Negative    ENT:  Negative    Respiratory:  Negative    Cardiovascular:  Negative    Gastroenterology: Negative    Genitourinary:  Negative    Musculoskeletal:  Positive for joint pain  Neurologic:  Negative    Psychiatric:  Negative    Heme/Lymph/Imm:  Negative    Endocrine:  Negative      PHYSICAL EXAM:      BP: (!) 144/88 Pulse: 89     SpO2: 99 %      Vital Signs with Ranges  Pulse:  [89] 89  BP: (160)/(104) 160/104  SpO2:  [99 %] 99 %  184 lbs 8 oz    Constitutional: awake, alert, no distress  Eyes: sclera nonicteric  ENT: trachea midline  Respiratory: Clear to auscultation bilaterally  Cardiovascular: Regular rate and rhythm no murmur rub or gallop  GI: nondistended, nontender, bowel sounds present  Skin: dry, no rash no edema  Musculoskeletal: grossly normal muscle bulk and tone  Neuropsychiatric: appropriate affect      DATA:   LAST CHOLESTEROL:  Lab Results   Component Value Date    CHOL 216 03/05/2021     Lab Results   Component Value Date    HDL 80 03/05/2021     Lab Results   Component Value Date     03/05/2021     03/06/2017     Lab Results   Component Value Date    TRIG 62 03/05/2021     Lab Results   Component Value Date     "CHOLHDLRATIO 3 03/05/2021       LAST BMP:  Lab Results   Component Value Date    .7 11/02/2021      Lab Results   Component Value Date    POTASSIUM 4.44 11/02/2021     Lab Results   Component Value Date    CHLORIDE 100.6 11/02/2021     Lab Results   Component Value Date    RODRÍGUEZ 10.0 11/02/2021     Lab Results   Component Value Date    CO2 31.4 11/02/2021     Lab Results   Component Value Date    BUN 15 11/02/2021    BUN 20.0 11/02/2021     Lab Results   Component Value Date    CR 0.75 11/02/2021     Lab Results   Component Value Date    GLC 97 11/02/2021       LAST CBC:  Lab Results   Component Value Date    WBC 6.4 11/02/2021     Lab Results   Component Value Date    RBC 4.48 11/02/2021     Lab Results   Component Value Date    HGB 14.8 11/02/2021     Lab Results   Component Value Date    HCT 44.6 11/02/2021     Lab Results   Component Value Date    MCV 99.5 11/02/2021     Lab Results   Component Value Date    MCH 33.0 11/02/2021     Lab Results   Component Value Date    MCHC 33.2 11/02/2021     No results found for: RDW  Lab Results   Component Value Date     11/02/2021         EKG 1/2/21 sinus w/lbbb    lexiscan stress test 11/5/21     The nuclear stress test is probably negative for inducible myocardial ischemia or infarction.     Nuclear Study Quality: Small, distal anteroseptal defect, suspect this is secondary to left bundle branch block.  Less likely would be small area of infarct.     The left ventricular ejection fraction at stress is greater than 70%.     There is no prior study for comparison.        ASSESSMENT:  1. Left bundle branch block.  2. Plan for left hip arthroplasty as planned.  She has low estimated cardiac risk for major adverse event for a moderate risk non-cardiac surgery.   3. Stress test negative for inducible ischemia (\"probably\" negative with lower level of certainty due to underlying LBBB).  4. Elevated blood pressure without history of hypertension  5. Family history of " possible clotting disorder.    RECOMMENDATIONS:  1. She will take aspirin post-op per hematology  2. No additional cardiovascular testing recommended prior to surgery.  3. Proceed with left hip arthoplasty as planned  4. Follow up approximately one month post-op with Anyi Gracia to evaluate blood pressure.  I do not recommend initiation of an antihypertensive agent/ beta-blocker at this time in the preoperative.  In the context of elevated blood pressure without a diagnosis of hypertension and with significant ongoing hip pain.  5. Continue routine primary care postoperatively.  Cardiology follow-up as needed.      Sarah Alford MD [facility]  Text Page

## 2021-11-16 NOTE — TELEPHONE ENCOUNTER
Pre op note, EKG and lexiscan sent to Sahra with [facility].  I left a message for patient that the information was sent to Sahra with [facility]

## 2021-11-16 NOTE — TELEPHONE ENCOUNTER
Sahra, Care Coordinator with [facility] called asking that we send her the Pre Op office note when closed.     Dr. Gracia please let me know when you have closed the 11/2/2021 office note so I can send to Sahra Bess with [facility]. Sahra will get the note to the surgery Center also    Thank you

## 2022-02-18 ENCOUNTER — TRANSFERRED RECORDS (OUTPATIENT)
Dept: FAMILY MEDICINE | Facility: CLINIC | Age: 67
End: 2022-02-18

## 2022-02-18 LAB — MAMMOGRAM: NORMAL

## 2022-04-09 ENCOUNTER — HEALTH MAINTENANCE LETTER (OUTPATIENT)
Age: 67
End: 2022-04-09

## 2022-07-30 ENCOUNTER — HEALTH MAINTENANCE LETTER (OUTPATIENT)
Age: 67
End: 2022-07-30

## 2022-10-09 ENCOUNTER — HEALTH MAINTENANCE LETTER (OUTPATIENT)
Age: 67
End: 2022-10-09

## 2022-11-23 ENCOUNTER — OFFICE VISIT (OUTPATIENT)
Dept: FAMILY MEDICINE | Facility: CLINIC | Age: 67
End: 2022-11-23

## 2022-11-23 VITALS
WEIGHT: 191 LBS | SYSTOLIC BLOOD PRESSURE: 130 MMHG | OXYGEN SATURATION: 98 % | BODY MASS INDEX: 29.98 KG/M2 | DIASTOLIC BLOOD PRESSURE: 84 MMHG | HEIGHT: 67 IN | HEART RATE: 90 BPM | TEMPERATURE: 97.7 F

## 2022-11-23 DIAGNOSIS — J10.1 INFLUENZA A: Primary | ICD-10-CM

## 2022-11-23 LAB
FLUAV AG UPPER RESP QL IA.RAPID: ABNORMAL
FLUBV AG UPPER RESP QL IA.RAPID: ABNORMAL

## 2022-11-23 PROCEDURE — 87804 INFLUENZA ASSAY W/OPTIC: CPT | Performed by: PHYSICIAN ASSISTANT

## 2022-11-23 PROCEDURE — 99213 OFFICE O/P EST LOW 20 MIN: CPT | Performed by: PHYSICIAN ASSISTANT

## 2022-11-23 RX ORDER — OSELTAMIVIR PHOSPHATE 75 MG/1
75 CAPSULE ORAL 2 TIMES DAILY
Qty: 10 CAPSULE | Refills: 0 | Status: SHIPPED | OUTPATIENT
Start: 2022-11-23 | End: 2022-11-28

## 2022-11-23 NOTE — PROGRESS NOTES
"CC: Influenza?    History:  Influenza:  Patient is here with symptoms that started very early Tuesday morning. Woke up in the middle of night cough, voice was hoarse, headache. Has been getting a low grade fever, but did spike to 101 overnight. Has been taking Tylenol, cough.     No body aches, sore throat, SOB.  got diagnosed with influenza yesterday.     PMH, MEDICATIONS, ALLERGIES, SOCIAL AND FAMILY HISTORY in [facility] and reviewed by me personally.    ROS negative other than the symptoms noted above in the HPI.      Examination:  /84 (BP Location: Right arm, Patient Position: Sitting, Cuff Size: Adult Large)   Pulse 90   Temp 97.7  F (36.5  C) (Temporal)   Ht 1.702 m (5' 7\")   Wt 86.6 kg (191 lb)   LMP 08/31/2012   SpO2 98%   BMI 29.91 kg/m       Constitutional: Sitting comfortably, in no acute distress. Vital signs noted  Ears: external canals and TMs free of abnormalities  Nose: patent, without mucosal abnormalities  Mouth and throat: without erythema or lesions of the mucosa  Neck:  no adenopathy, trachea midline and normal to palpation, thyroid normal to palpation  Cardiovascular:  regular rate and rhythm, no murmurs, clicks, or gallops  Respiratory:  normal respiratory rate and rhythm, lungs clear to auscultation  SKIN: No jaundice/pallor/rash.   Psychiatric: mentation appears normal and affect normal/bright    A/P    ICD-10-CM    1. Influenza A  J10.1 Influenza A and B (BFP)     oseltamivir (TAMIFLU) 75 MG capsule          DISCUSSION:  Influenza swab positive for infleunza A. Explained that infleunza tends to resolve within 5-7 days.   Offered Tamiflu to take if interested. No high-quality evidence to say it's effective, and if effective thought to only shorten symptoms by approximately 1 day.   Patient approved.   Gave work excuse note to return to work when symptoms resolve.   Further recommended symptomatic therapies:  Alternate ibuprofen (with food) and Tylenol every 3 hours to help " with pain and inflammation.  Can substitute cough suppressant like Dayquil/Delsym/Robitussin for Tylenol dose, as these usually contain Tylenol/acetaminophen.  Should continue to push fluids, and should try to find simple foods to help gain calories. Go to ER if unable to get fluid intake, extreme fatigue, or cannot keep fever <102 degrees.     Contact me with any questions or concerns.    follow up visit: As needed    Cece Ghosh PA-C  [facility]

## 2022-11-23 NOTE — NURSING NOTE
Chief Complaint   Patient presents with     Cough     Cough started yesterday morning, loss of voice but her voice came back today  Low grade feer 101.5, this morning was 99.0         Pre-visit Screening:  Immunizations: not up to date-- prevnar, tdap, and flu at the pharmacy  Colonoscopy:  is up to date  Mammogram: is up to date  Asthma Action Test/Plan:  NA  PHQ9:  NA  GAD7:  NA  Questioned patient about current smoking habits Pt. has never smoked.  Ok to leave detailed message on voice mail for today's visit only Yes, phone # [phone]

## 2023-01-17 ENCOUNTER — OFFICE VISIT (OUTPATIENT)
Dept: FAMILY MEDICINE | Facility: CLINIC | Age: 68
End: 2023-01-17

## 2023-01-17 ENCOUNTER — TELEPHONE (OUTPATIENT)
Dept: FAMILY MEDICINE | Facility: CLINIC | Age: 68
End: 2023-01-17

## 2023-01-17 VITALS
SYSTOLIC BLOOD PRESSURE: 144 MMHG | WEIGHT: 196 LBS | DIASTOLIC BLOOD PRESSURE: 86 MMHG | HEART RATE: 62 BPM | HEIGHT: 67 IN | TEMPERATURE: 97.3 F | OXYGEN SATURATION: 99 % | BODY MASS INDEX: 30.76 KG/M2

## 2023-01-17 DIAGNOSIS — R03.0 ELEVATED BP WITHOUT DIAGNOSIS OF HYPERTENSION: ICD-10-CM

## 2023-01-17 DIAGNOSIS — Z01.818 PREOPERATIVE EXAMINATION: Primary | ICD-10-CM

## 2023-01-17 DIAGNOSIS — M16.11 PRIMARY OSTEOARTHRITIS OF RIGHT HIP: ICD-10-CM

## 2023-01-17 LAB
ERYTHROCYTE [DISTWIDTH] IN BLOOD BY AUTOMATED COUNT: 11.2 %
HCT VFR BLD AUTO: 43.2 % (ref 35–47)
HEMOGLOBIN: 14.2 G/DL (ref 11.7–15.7)
MCH RBC QN AUTO: 32.8 PG (ref 26–33)
MCHC RBC AUTO-ENTMCNC: 32.9 G/DL (ref 31–36)
MCV RBC AUTO: 99.7 FL (ref 78–100)
PLATELET COUNT - QUEST: 248 10^9/L (ref 150–375)
RBC # BLD AUTO: 4.33 10*12/L (ref 3.8–5.2)
WBC # BLD AUTO: 7 10*9/L (ref 4–11)

## 2023-01-17 PROCEDURE — 85027 COMPLETE CBC AUTOMATED: CPT | Performed by: PHYSICIAN ASSISTANT

## 2023-01-17 PROCEDURE — 93000 ELECTROCARDIOGRAM COMPLETE: CPT | Performed by: PHYSICIAN ASSISTANT

## 2023-01-17 PROCEDURE — 99214 OFFICE O/P EST MOD 30 MIN: CPT | Mod: 25 | Performed by: PHYSICIAN ASSISTANT

## 2023-01-17 PROCEDURE — 36415 COLL VENOUS BLD VENIPUNCTURE: CPT | Performed by: PHYSICIAN ASSISTANT

## 2023-01-17 RX ORDER — COVID-19 ANTIGEN TEST
220 KIT MISCELLANEOUS PRN
COMMUNITY
End: 2023-05-15

## 2023-01-17 NOTE — PROGRESS NOTES
[facility]  [address]  [address]  [facility] [address]  Phone: [phone]  Fax: [phone]  Primary Provider: Anyi Gracia  Pre-op Performing Provider: OMARI LIU      PREOPERATIVE EVALUATION:  Today's date: 1/17/2023    Paulette Griffin is a 67 year old female who presents for a preoperative evaluation.    Surgical Information:  Surgery/Procedure: Right hip replacement  Surgery Location: [facility]  Surgeon: Dr. Milad Mike  Surgery Date: 2/7/2023  Time of Surgery: AM  Where patient plans to recover: Other: overnight [facility] program  Fax number for surgical facility: [phone]    Type of Anesthesia Anticipated: to be determined    Assessment & Plan     The proposed surgical procedure is considered INTERMEDIATE risk.    Preoperative examination  Primary osteoarthritis of right hip  - EKG 12-lead complete w/read - Clinics  - VENOUS COLLECTION  - Hemogram Platelet (BFP)    Elevated BP without diagnosis of hypertension  - VENOUS COLLECTION  - Hemogram Platelet (BFP)    Risks and Recommendations:  The patient has the following additional risks and recommendations for perioperative complications:   - No identified additional risk factors other than previously addressed    Medication Instructions:  Patient is to take all scheduled medications on the day of surgery EXCEPT for modifications listed below:   Holding supplements for 2 weeks. Holding Aleve for 5 days prior to surgery.     RECOMMENDATION:  APPROVAL GIVEN to proceed with proposed procedure, without further diagnostic evaluation.    30 minutes spent on the date of the encounter doing chart review, review of test results, interpretation of tests, patient visit and documentation   {Provider  Link to [facility] Help Grid     Subjective     HPI related to upcoming procedure:   Developed significant right hip pain in the past 1-2 months. Found to have severe OA. Plan is for surgical repair.     1. No -  Have you ever had a heart attack or stroke?  2. No - Have you ever had surgery on your heart or blood vessels, such as a stent, coronary (heart) bypass, or surgery on an artery in the head, neck, heart, or legs?  3. No - Do you have chest pain when you are physically active?  4. No - Do you have a history of heart failure?  5. No - Do you currently have a cold, bronchitis, or symptoms of other respiratory (head and chest) infections?  6. No - Do you have a cough, shortness of breath, or wheezing?  7. Yes - Do you or anyone in your family have a history of blood clots? Father had DVT. Mother had DVT surrounding OCP. Pt herself visited with hematologist prior to 2021 surgery without concern, but did advise post op ASA.  8. No - Do you or anyone in your family have a serious bleeding problem, such as long-lasting bleeding after surgeries or cuts?  9. Yes - Have you ever had anemia or been told to take iron pills? As a toddler.   10. No - Have you had any abnormal blood loss such as black, tarry or bloody stools, or abnormal vaginal bleeding?  11. No - Have you ever had a blood transfusion?  12. Yes - Are you willing to have a blood transfusion if it is medically needed before, during, or after your surgery?  13. Yes - Have you or anyone in your family ever had problems with anesthesia (sedation for surgery)? Pt herself gets nausea  14. No - Do you have sleep apnea, excessive snoring, or daytime drowsiness?   15. No - Do you have any artifical heart valves or other implanted medical devices, such as a pacemaker, defibrillator, or continuous glucose monitor? No What type of device? No. What is the name of the clinic that manages your device? No  16. Yes - Do you have any artifical joints? Yes, left hip replacement 11/2021.  17. No - Are you allergic to latex?  18. No - Is there any chance that you may be pregnant?    Health Care Directive:  Patient does not have a Health Care Directive or Living Will: Patient states has  Advance Directive and will bring in a copy to clinic.    Preoperative Review of :   reviewed - no record of controlled substances prescribed.  {Review [place]MP for all patients per [facility] MNPMP Profile    Status of Chronic Conditions:  See problem list for active medical problems.  Problems all longstanding and stable, except as noted/documented.  See ROS for pertinent symptoms related to these conditions.    Review of Systems  Constitutional, neuro, ENT, endocrine, pulmonary, cardiac, gastrointestinal, genitourinary, musculoskeletal, integument and psychiatric systems are negative, except as otherwise noted.    Patient Active Problem List    Diagnosis Date Noted     Family history of clotting disorder 11/02/2021     Priority: Medium     Health Care Home 10/10/2012     Priority: Medium     State Tier Level:  Tier 0  Status:  n/a  Care Coordinator:   See Letters for [facility] Care Plan             ACP (advance care planning) 10/10/2012     Priority: Medium     Advance Care Planning 3/16/2017: ACP Review of Chart / Resources Provided:  Reviewed chart for advance care plan.  Paulette Griffin has no plan or code status on file. Discussed available resources and provided with information. Confirmed code status reflects current choices pending further ACP discussions.  Confirmed/documented legally designated decision makers.  Added by Julissa Paredes                     Herpes simplex virus (HSV) infection 03/03/2005     Priority: Medium     Problem list name updated by automated process. Provider to review       Ovarian cyst 05/22/2003     Priority: Medium     Problem list name updated by automated process. Provider to review        No past medical history on file.  Past Surgical History:   Procedure Laterality Date     HC REMOVAL OF TONSILS,<13 Y/O      Tonsillectomy, under 12 yrs     HYSTEROSCOPY  07/01/2015    polyp     JOINT REPLACEMENT, HIP RT/LT Left 11/2021    [facility]     Current Outpatient Medications   Medication Sig  "Dispense Refill     acetaminophen (TYLENOL) 500 MG tablet Take 500-1,000 mg by mouth every 6 hours as needed for mild pain       Homeopathic Products (AVOCADO REVITALIZING EX)        MELATONIN PO Take 3 mg by mouth nightly as needed       multivitamin w/minerals (THERA-VIT-M) tablet Take 1 tablet by mouth daily       naproxen sodium 220 MG capsule Take 220 mg by mouth as needed PRN for pain         Allergies   Allergen Reactions     No Known Allergies         Social History     Tobacco Use     Smoking status: Former     Smokeless tobacco: Never     Tobacco comments:     teenager   Substance Use Topics     Alcohol use: Yes     Alcohol/week: 1.0 standard drink     Types: 1 Glasses of wine per week     Family History   Problem Relation Age of Onset     Lung Cancer Mother      Heart Disease Father         cabg x 4     Dementia Father      Diabetes Type 2  Father      Deep Vein Thrombosis Father      Cancer Maternal Aunt         bone     Cancer Paternal Aunt         primary site unknown     Diabetes Paternal Aunt      Diabetes Paternal Uncle      Colon Cancer Paternal Uncle         70s     Alzheimer Disease No family hx of      Breast Cancer No family hx of      History   Drug Use No         Objective     BP (!) 144/86 (BP Location: Right arm, Patient Position: Sitting, Cuff Size: Adult Large)   Pulse 62   Temp 97.3  F (36.3  C) (Temporal)   Ht 1.702 m (5' 7\")   Wt 88.9 kg (196 lb)   LMP 08/31/2012   SpO2 99%   BMI 30.70 kg/m      Physical Exam    GENERAL APPEARANCE: healthy, alert and no distress     EYES: EOMI, PERRL     HENT: ear canals and TM's normal and nose and mouth without ulcers or lesions     NECK: no adenopathy, no asymmetry, masses, or scars and thyroid normal to palpation     RESP: lungs clear to auscultation - no rales, rhonchi or wheezes     CV: regular rates and rhythm, normal S1 S2, no S3 or S4 and no murmur, click or rub     ABDOMEN:  soft, nontender, no HSM or masses and bowel sounds normal     " MS: extremities normal- no gross deformities noted, no evidence of inflammation in joints, FROM in all extremities.     SKIN: no suspicious lesions or rashes     NEURO: Normal strength and tone, sensory exam grossly normal, mentation intact and speech normal     PSYCH: mentation appears normal. and affect normal/bright     LYMPHATICS: No cervical adenopathy    Diagnostics:  Recent Results (from the past 24 hour(s))   Hemogram Platelet (BFP)    Collection Time: 01/17/23 12:00 AM   Result Value Ref Range    WBC 7.0 4.0 - 11 10*9/L    RBC Count 4.33 3.8 - 5.2 10*12/L    Hemoglobin 14.2 11.7 - 15.7 g/dL    Hematocrit 43.2 35.0 - 47.0 %    MCV 99.7 78 - 100 fL    MCH 32.8 26 - 33 pg    MCHC 32.9 31 - 36 g/dL    RDW 11.2 %    Platelet Count 248 150 - 375 10^9/L      EKG: appears normal, NSR, normal axis, normal intervals, no acute ST/T changes c/w ischemia, no LVH by voltage criteria, Left Bundle Branch Block, unchanged from previous tracings. LBBB similar to EKG 11/2021, where had NM Lexiscan that was reassuring, and cardiologist cleared.     Revised Cardiac Risk Index (RCRI):  The patient has the following serious cardiovascular risks for perioperative complications:   - No serious cardiac risks = 0 points     RCRI Interpretation: 1 point: Class II (low risk - 0.9% complication rate)     Signed Electronically by: Cece Ghosh PA-C  Copy of this evaluation report is provided to requesting physician.    Provider Resources  [facility] Preop Guidelines  Revised Cardiac Risk Index

## 2023-01-17 NOTE — NURSING NOTE
Chief Complaint   Patient presents with     Pre-Op Exam     Right hip surgery  2/7/23    [facility]

## 2023-01-17 NOTE — LETTER
[facility]  [address]  [address]  [facility] [address]  Phone: [phone]  Fax: [phone]  Primary Provider: Anyi Gracia  Pre-op Performing Provider: OMARI LIU      PREOPERATIVE EVALUATION:  Today's date: 1/17/2023    Paulette Griffin is a 67 year old female who presents for a preoperative evaluation.    Surgical Information:  Surgery/Procedure: Right hip replacement  Surgery Location: [facility]  Surgeon: Dr. Milad Mike  Surgery Date: 2/7/2023  Time of Surgery: AM  Where patient plans to recover: Other: overnight [facility]CEL program  Fax number for surgical facility: [phone]    Type of Anesthesia Anticipated: to be determined    Assessment & Plan     The proposed surgical procedure is considered INTERMEDIATE risk.    Preoperative examination  Primary osteoarthritis of right hip  - EKG 12-lead complete w/read - Clinics  - VENOUS COLLECTION  - Hemogram Platelet (BFP)    Elevated BP without diagnosis of hypertension  - VENOUS COLLECTION  - Hemogram Platelet (BFP)    Risks and Recommendations:  The patient has the following additional risks and recommendations for perioperative complications:   - No identified additional risk factors other than previously addressed    Medication Instructions:  Patient is to take all scheduled medications on the day of surgery EXCEPT for modifications listed below:   Holding supplements for 2 weeks. Holding Aleve for 5 days prior to surgery.     RECOMMENDATION:  APPROVAL GIVEN to proceed with proposed procedure, without further diagnostic evaluation.    30 minutes spent on the date of the encounter doing chart review, review of test results, interpretation of tests, patient visit and documentation   {Provider  Link to [facility] Help Grid     Subjective     HPI related to upcoming procedure:   Developed significant right hip pain in the past 1-2 months. Found to have severe OA. Plan is for surgical repair.     1. No -  Have you ever had a heart attack or stroke?  2. No - Have you ever had surgery on your heart or blood vessels, such as a stent, coronary (heart) bypass, or surgery on an artery in the head, neck, heart, or legs?  3. No - Do you have chest pain when you are physically active?  4. No - Do you have a history of heart failure?  5. No - Do you currently have a cold, bronchitis, or symptoms of other respiratory (head and chest) infections?  6. No - Do you have a cough, shortness of breath, or wheezing?  7. Yes - Do you or anyone in your family have a history of blood clots? Father had DVT. Mother had DVT surrounding OCP. Pt herself visited with hematologist prior to 2021 surgery without concern, but did advise post op ASA.  8. No - Do you or anyone in your family have a serious bleeding problem, such as long-lasting bleeding after surgeries or cuts?  9. Yes - Have you ever had anemia or been told to take iron pills? As a toddler.   10. No - Have you had any abnormal blood loss such as black, tarry or bloody stools, or abnormal vaginal bleeding?  11. No - Have you ever had a blood transfusion?  12. Yes - Are you willing to have a blood transfusion if it is medically needed before, during, or after your surgery?  13. Yes - Have you or anyone in your family ever had problems with anesthesia (sedation for surgery)? Pt herself gets nausea  14. No - Do you have sleep apnea, excessive snoring, or daytime drowsiness?   15. No - Do you have any artifical heart valves or other implanted medical devices, such as a pacemaker, defibrillator, or continuous glucose monitor? No What type of device? No. What is the name of the clinic that manages your device? No  16. Yes - Do you have any artifical joints? Yes, left hip replacement 11/2021.  17. No - Are you allergic to latex?  18. No - Is there any chance that you may be pregnant?    Health Care Directive:  Patient does not have a Health Care Directive or Living Will: Patient states has  Advance Directive and will bring in a copy to clinic.    Preoperative Review of :   reviewed - no record of controlled substances prescribed.  {Review [place]MP for all patients per [facility] [place]MP Profile    Status of Chronic Conditions:  See problem list for active medical problems.  Problems all longstanding and stable, except as noted/documented.  See ROS for pertinent symptoms related to these conditions.    Review of Systems  Constitutional, neuro, ENT, endocrine, pulmonary, cardiac, gastrointestinal, genitourinary, musculoskeletal, integument and psychiatric systems are negative, except as otherwise noted.    Patient Active Problem List    Diagnosis Date Noted     Family history of clotting disorder 11/02/2021     Priority: Medium     Health Care Home 10/10/2012     Priority: Medium     State Tier Level:  Tier 0  Status:  n/a  Care Coordinator:   See Letters for [facility] Care Plan             ACP (advance care planning) 10/10/2012     Priority: Medium     Advance Care Planning 3/16/2017: ACP Review of Chart / Resources Provided:  Reviewed chart for advance care plan.  Paulette Griffin has no plan or code status on file. Discussed available resources and provided with information. Confirmed code status reflects current choices pending further ACP discussions.  Confirmed/documented legally designated decision makers.  Added by Julissa Paredes                     Herpes simplex virus (HSV) infection 03/03/2005     Priority: Medium     Problem list name updated by automated process. Provider to review       Ovarian cyst 05/22/2003     Priority: Medium     Problem list name updated by automated process. Provider to review        No past medical history on file.  Past Surgical History:   Procedure Laterality Date     HC REMOVAL OF TONSILS,<13 Y/O      Tonsillectomy, under 12 yrs     HYSTEROSCOPY  07/01/2015    polyp     JOINT REPLACEMENT, HIP RT/LT Left 11/2021    [facility]     Current Outpatient Medications   Medication Sig  "Dispense Refill     acetaminophen (TYLENOL) 500 MG tablet Take 500-1,000 mg by mouth every 6 hours as needed for mild pain       Homeopathic Products (AVOCADO REVITALIZING EX)        MELATONIN PO Take 3 mg by mouth nightly as needed       multivitamin w/minerals (THERA-VIT-M) tablet Take 1 tablet by mouth daily       naproxen sodium 220 MG capsule Take 220 mg by mouth as needed PRN for pain         Allergies   Allergen Reactions     No Known Allergies         Social History     Tobacco Use     Smoking status: Former     Smokeless tobacco: Never     Tobacco comments:     teenager   Substance Use Topics     Alcohol use: Yes     Alcohol/week: 1.0 standard drink     Types: 1 Glasses of wine per week     Family History   Problem Relation Age of Onset     Lung Cancer Mother      Heart Disease Father         cabg x 4     Dementia Father      Diabetes Type 2  Father      Deep Vein Thrombosis Father      Cancer Maternal Aunt         bone     Cancer Paternal Aunt         primary site unknown     Diabetes Paternal Aunt      Diabetes Paternal Uncle      Colon Cancer Paternal Uncle         70s     Alzheimer Disease No family hx of      Breast Cancer No family hx of      History   Drug Use No         Objective     BP (!) 144/86 (BP Location: Right arm, Patient Position: Sitting, Cuff Size: Adult Large)   Pulse 62   Temp 97.3  F (36.3  C) (Temporal)   Ht 1.702 m (5' 7\")   Wt 88.9 kg (196 lb)   LMP 08/31/2012   SpO2 99%   BMI 30.70 kg/m      Physical Exam    GENERAL APPEARANCE: healthy, alert and no distress     EYES: EOMI, PERRL     HENT: ear canals and TM's normal and nose and mouth without ulcers or lesions     NECK: no adenopathy, no asymmetry, masses, or scars and thyroid normal to palpation     RESP: lungs clear to auscultation - no rales, rhonchi or wheezes     CV: regular rates and rhythm, normal S1 S2, no S3 or S4 and no murmur, click or rub     ABDOMEN:  soft, nontender, no HSM or masses and bowel sounds normal     " MS: extremities normal- no gross deformities noted, no evidence of inflammation in joints, FROM in all extremities.     SKIN: no suspicious lesions or rashes     NEURO: Normal strength and tone, sensory exam grossly normal, mentation intact and speech normal     PSYCH: mentation appears normal. and affect normal/bright     LYMPHATICS: No cervical adenopathy    Diagnostics:  Recent Results (from the past 24 hour(s))   Hemogram Platelet (BFP)    Collection Time: 01/17/23 12:00 AM   Result Value Ref Range    WBC 7.0 4.0 - 11 10*9/L    RBC Count 4.33 3.8 - 5.2 10*12/L    Hemoglobin 14.2 11.7 - 15.7 g/dL    Hematocrit 43.2 35.0 - 47.0 %    MCV 99.7 78 - 100 fL    MCH 32.8 26 - 33 pg    MCHC 32.9 31 - 36 g/dL    RDW 11.2 %    Platelet Count 248 150 - 375 10^9/L      EKG: appears normal, NSR, normal axis, normal intervals, no acute ST/T changes c/w ischemia, no LVH by voltage criteria, Left Bundle Branch Block, unchanged from previous tracings. LBBB similar to EKG 11/2021, where had NM Lexiscan that was reassuring, and cardiologist cleared.     Revised Cardiac Risk Index (RCRI):  The patient has the following serious cardiovascular risks for perioperative complications:   - No serious cardiac risks = 0 points     RCRI Interpretation: 1 point: Class II (low risk - 0.9% complication rate)     Signed Electronically by: Cece Ghosh PA-C  Copy of this evaluation report is provided to requesting physician.    Provider Resources  [facility] Preop Guidelines  Revised Cardiac Risk Index

## 2023-01-17 NOTE — TELEPHONE ENCOUNTER
[facility] PT faxed an evaluation to be reviewed and signed by Cece.  In your pod, thanks.

## 2023-02-08 NOTE — PROGRESS NOTES
"  Assessment & Plan     ACP (advance care planning)      Herpes zoster without complication-rash appears to be shingles, as patient is already on acyclovir will continue treatment and await improvement  Avoid touching rash, wash hand after contact  Keep area covered, do not come into contact with pregnant women  Watch for secondary bacterial infection-described this to patient and daughter  Call with new or worsening symptoms      Follow up as needed    No follow-ups on file.    Dwight Castaneda PA-C  [facility]    Subjective   Karlene is a 67 year old, presenting for the following health issues:  Rash      HPI     Patient presents for rash recheck. Had R hip replacement recently (2 days ago) and healthcare provider saw a rash. Suspected to be shingles, put on acyclovir, continues to take this. Rash is on R buttock. No pain, burning or tingling with this rash. She did put a heating cream on this area Sunday AM and felt some burning-unsure if this is related. Washed off after this. Noticed rash the next AM in this area. Had 2 shingles shots. Feels otherwise well today.     Review of Systems   Constitutional, HEENT, cardiovascular, pulmonary, gi and gu systems are negative, except as otherwise noted.      Objective    /70 (BP Location: Right arm, Patient Position: Chair, Cuff Size: Adult Regular)   Pulse 68   Temp 97.9  F (36.6  C) (Temporal)   Resp 20   Ht 1.702 m (5' 7\")   Wt 88.9 kg (196 lb)   LMP 08/31/2012   BMI 30.70 kg/m    Body mass index is 30.7 kg/m .  Physical Exam   GENERAL: healthy, alert and no distress  NECK: no adenopathy, no asymmetry, masses, or scars and thyroid normal to palpation  RESP: lungs clear to auscultation - no rales, rhonchi or wheezes  CV: regular rate and rhythm, normal S1 S2, no S3 or S4, no murmur, click or rub, no peripheral edema and peripheral pulses strong  ABDOMEN: soft, nontender, no hepatosplenomegaly, no masses and bowel sounds normal  SKIN: R buttock: " small grouped vesicles with erythematous based noted, rash doesn't cross midline

## 2023-02-09 ENCOUNTER — OFFICE VISIT (OUTPATIENT)
Dept: FAMILY MEDICINE | Facility: CLINIC | Age: 68
End: 2023-02-09

## 2023-02-09 VITALS
HEART RATE: 68 BPM | TEMPERATURE: 97.9 F | DIASTOLIC BLOOD PRESSURE: 70 MMHG | HEIGHT: 67 IN | RESPIRATION RATE: 20 BRPM | BODY MASS INDEX: 30.76 KG/M2 | WEIGHT: 196 LBS | SYSTOLIC BLOOD PRESSURE: 118 MMHG

## 2023-02-09 DIAGNOSIS — Z71.89 ACP (ADVANCE CARE PLANNING): ICD-10-CM

## 2023-02-09 DIAGNOSIS — B02.9 HERPES ZOSTER WITHOUT COMPLICATION: Primary | ICD-10-CM

## 2023-02-09 PROCEDURE — 99213 OFFICE O/P EST LOW 20 MIN: CPT | Performed by: PHYSICIAN ASSISTANT

## 2023-02-09 RX ORDER — OXYCODONE HYDROCHLORIDE 5 MG/1
TABLET ORAL PRN
COMMUNITY
Start: 2023-02-06 | End: 2023-05-15

## 2023-02-09 RX ORDER — ACYCLOVIR 400 MG/1
TABLET ORAL
COMMUNITY
Start: 2023-02-07 | End: 2023-05-15

## 2023-02-09 RX ORDER — ASPIRIN 325 MG
325 TABLET, DELAYED RELEASE (ENTERIC COATED) ORAL 2 TIMES DAILY
COMMUNITY
Start: 2023-02-09 | End: 2023-03-09

## 2023-02-09 NOTE — NURSING NOTE
Paulette Griffin is here for possible shingles on right glute. Had surgery the other day and think that is what it is. She began treatment for this.    Questioned patient about current smoking habits.  Pt. quit smoking some time ago.  PULSE regular  My Chart: active  CLASSIFICATION OF OVERWEIGHT AND OBESITY BY BMI                        Obesity Class           BMI(kg/m2)  Underweight                                    < 18.5  Normal                                         18.5-24.9  Overweight                                     25.0-29.9  OBESITY                     I                  30.0-34.9                             II                 35.0-39.9  EXTREME OBESITY             III                >40                            Patient's  BMI Body mass index is 30.7 kg/m .  http://hin.nhlbi.nih.gov/menuplanner/menu.cgi  Pre-visit planning  Immunizations - up to date  Colonoscopy -   Mammogram -   Asthma -   PHQ9 -    TRINITY-7 -

## 2023-05-08 NOTE — PROGRESS NOTES
SUBJECTIVE:   CC: Karlene is an 67 year old who presents for preventive health visit.   Patient has been advised of split billing requirements and indicates understanding: Yes  HPI    Here for a physical.  Has a skin lesion on her back, no idea how long. No changes since it's been noticed in November.         Today's PHQ-2 Score:       2/9/2023    11:43 AM   PHQ-2 ( 1999 [employer])   Q1: Little interest or pleasure in doing things 0   Q2: Feeling down, depressed or hopeless 0   PHQ-2 Score 0           Social History     Tobacco Use     Smoking status: Former     Passive exposure: Past     Smokeless tobacco: Never     Tobacco comments:     teenager   Vaping Use     Vaping status: Not on file   Substance Use Topics     Alcohol use: Yes     Alcohol/week: 1.0 standard drink of alcohol     Types: 1 Glasses of wine per week              View : No data to display.            no concerns  Reviewed orders with patient.  Reviewed health maintenance and updated orders accordingly - Yes  BP Readings from Last 3 Encounters:   05/15/23 130/74   02/09/23 118/70   01/17/23 (!) 144/86    Wt Readings from Last 3 Encounters:   05/15/23 78.5 kg (173 lb)   02/09/23 88.9 kg (196 lb)   01/17/23 88.9 kg (196 lb)                  Patient Active Problem List   Diagnosis     Ovarian cyst     Herpes simplex virus (HSV) infection     Health Care Home     ACP (advance care planning)     Family history of clotting disorder     Past Surgical History:   Procedure Laterality Date     HC REMOVAL OF TONSILS,<15 Y/O      Tonsillectomy, under 14 yrs     HYSTEROSCOPY  07/01/2015    polyp     JOINT REPLACEMENT, HIP RT/LT Left 11/2021    [facility]       Social History     Tobacco Use     Smoking status: Former     Passive exposure: Past     Smokeless tobacco: Never     Tobacco comments:     teenager   Vaping Use     Vaping status: Not on file   Substance Use Topics     Alcohol use: Yes     Alcohol/week: 1.0 standard drink of alcohol     Types: 1 Glasses of wine per  week     Family History   Problem Relation Age of Onset     Lung Cancer Mother      Heart Disease Father         cabg x 4     Dementia Father      Diabetes Type 2  Father      Deep Vein Thrombosis Father      Cancer Maternal Aunt         bone     Cancer Paternal Aunt         primary site unknown     Diabetes Paternal Aunt      Diabetes Paternal Uncle      Colon Cancer Paternal Uncle         70s     Alzheimer Disease No family hx of      Breast Cancer No family hx of          Current Outpatient Medications   Medication Sig Dispense Refill     acetaminophen (TYLENOL) 500 MG tablet Take 500-1,000 mg by mouth every 6 hours as needed for mild pain       MELATONIN PO Take 3 mg by mouth nightly as needed       multivitamin w/minerals (THERA-VIT-M) tablet Take 1 tablet by mouth daily         Breast Cancer Screening:  Any new diagnosis of family breast, ovarian, or bowel cancer? No    FHS-7:        View : No data to display.              Uncle with colon cancer  mammogram up to date  Pertinent mammograms are reviewed under the imaging tab.    History of abnormal Pap smear: history normal pap.  No recent vaginal bleeding     Reviewed and updated as needed this visit by clinical staff   Tobacco  Allergies  Meds  Problems             Reviewed and updated as needed this visit by Provider                 No past medical history on file.   Past Surgical History:   Procedure Laterality Date     HC REMOVAL OF TONSILS,<13 Y/O      Tonsillectomy, under 14 yrs     HYSTEROSCOPY  07/01/2015    polyp     JOINT REPLACEMENT, HIP RT/LT Left 11/2021    [facility]       Review of Systems  CONSTITUTIONAL: NEGATIVE for fever, chills, change in weight  INTEGUMENTARY/SKIN: NEGATIVE for worrisome rashes, moles or lesions  EYES: NEGATIVE for vision changes or irritation  ENT: NEGATIVE for ear, mouth and throat problems  RESP: NEGATIVE for significant cough or SOB  BREAST: NEGATIVE for masses, tenderness or discharge  CV: NEGATIVE for chest pain,  "palpitations or peripheral edema  GI: NEGATIVE for nausea, abdominal pain, heartburn, or change in bowel habits  : NEGATIVE for unusual urinary or vaginal symptoms. No vaginal bleeding.  MUSCULOSKELETAL: NEGATIVE for significant arthralgias or myalgia  NEURO: NEGATIVE for weakness, dizziness or paresthesias  PSYCHIATRIC: NEGATIVE for changes in mood or affect      OBJECTIVE:   /74 (BP Location: Right arm, Patient Position: Sitting, Cuff Size: Adult Large)   Pulse 65   Temp 97.2  F (36.2  C) (Temporal)   Ht 1.702 m (5' 7\")   Wt 78.5 kg (173 lb)   LMP 08/31/2012   SpO2 97%   BMI 27.10 kg/m    Physical Exam  GENERAL: healthy, alert and no distress  EYES: Eyes grossly normal to inspection, PERRL and conjunctivae and sclerae normal  HENT: ear canals and TM's normal, nose and mouth without ulcers or lesions  NECK: no adenopathy, no asymmetry, masses, or scars and thyroid normal to palpation  RESP: lungs clear to auscultation - no rales, rhonchi or wheezes  BREAST: normal without masses, tenderness or nipple discharge and no palpable axillary masses or adenopathy  CV: regular rate and rhythm, normal S1 S2, no S3 or S4, no murmur, click or rub, no peripheral edema and peripheral pulses strong  ABDOMEN: soft, nontender, no hepatosplenomegaly, no masses and bowel sounds normal   (female): normal female external genitalia, normal urethral meatus, vaginal mucosa pink, moist, well rugated, and normal cervix/adnexa/uterus without masses or discharge  MS: no gross musculoskeletal defects noted, no edema  SKIN: no suspicious lesions or rashes  NEURO: Normal strength and tone, mentation intact and speech normal  PSYCH: mentation appears normal, affect normal/bright    Diagnostic Test Results:  Labs reviewed in Epic  No results found for any visits on 05/15/23.    ASSESSMENT/PLAN:   1. Encounter for routine history and physical exam in female patient    - VENOUS COLLECTION  - Lipid Panel (BFP)  - Basic Metabolic " "Panel (BFP)    2. Skin lesion  Referral to dermatology for additional evaluation  Patient has been advised of split billing requirements and indicates understanding: Yes      COUNSELING:  Reviewed preventive health counseling, as reflected in patient instructions       Regular exercise       Healthy diet/nutrition      BMI:   Estimated body mass index is 27.1 kg/m  as calculated from the following:    Height as of this encounter: 1.702 m (5' 7\").    Weight as of this encounter: 78.5 kg (173 lb).         She reports that she has quit smoking. She has been exposed to tobacco smoke. She has never used smokeless tobacco.      Anyi Gracia MD  [facility]  "

## 2023-05-15 ENCOUNTER — OFFICE VISIT (OUTPATIENT)
Dept: FAMILY MEDICINE | Facility: CLINIC | Age: 68
End: 2023-05-15

## 2023-05-15 VITALS
DIASTOLIC BLOOD PRESSURE: 74 MMHG | BODY MASS INDEX: 27.15 KG/M2 | OXYGEN SATURATION: 97 % | SYSTOLIC BLOOD PRESSURE: 130 MMHG | WEIGHT: 173 LBS | TEMPERATURE: 97.2 F | HEART RATE: 65 BPM | HEIGHT: 67 IN

## 2023-05-15 DIAGNOSIS — L98.9 SKIN LESION: ICD-10-CM

## 2023-05-15 DIAGNOSIS — Z00.00 ENCOUNTER FOR ROUTINE HISTORY AND PHYSICAL EXAM IN FEMALE PATIENT: Primary | ICD-10-CM

## 2023-05-15 LAB
BUN SERPL-MCNC: 12 MG/DL (ref 7–25)
BUN/CREATININE RATIO: 17.6 (ref 6–32)
CALCIUM SERPL-MCNC: 9.5 MG/DL (ref 8.6–10.3)
CHLORIDE SERPLBLD-SCNC: 102.5 MMOL/L (ref 98–110)
CHOLEST SERPL-MCNC: 212 MG/DL (ref 0–199)
CHOLEST/HDLC SERPL: 3 {RATIO} (ref 0–5)
CO2 SERPL-SCNC: 30.3 MMOL/L (ref 20–32)
CREAT SERPL-MCNC: 0.68 MG/DL (ref 0.6–1.3)
GLUCOSE SERPL-MCNC: 104 MG/DL (ref 60–99)
HDLC SERPL-MCNC: 73 MG/DL (ref 40–150)
LDLC SERPL CALC-MCNC: 127 MG/DL (ref 0–130)
POTASSIUM SERPL-SCNC: 4.54 MMOL/L (ref 3.5–5.3)
SODIUM SERPL-SCNC: 139.5 MMOL/L (ref 135–146)
TRIGL SERPL-MCNC: 62 MG/DL (ref 0–149)

## 2023-05-15 PROCEDURE — 99397 PER PM REEVAL EST PAT 65+ YR: CPT | Performed by: FAMILY MEDICINE

## 2023-05-15 PROCEDURE — 36415 COLL VENOUS BLD VENIPUNCTURE: CPT | Performed by: FAMILY MEDICINE

## 2023-05-15 PROCEDURE — 80061 LIPID PANEL: CPT | Performed by: FAMILY MEDICINE

## 2023-05-15 PROCEDURE — 80048 BASIC METABOLIC PNL TOTAL CA: CPT | Performed by: FAMILY MEDICINE

## 2023-05-15 ASSESSMENT — PATIENT HEALTH QUESTIONNAIRE - PHQ9
SUM OF ALL RESPONSES TO PHQ QUESTIONS 1-9: 8
5. POOR APPETITE OR OVEREATING: NEARLY EVERY DAY

## 2023-05-15 ASSESSMENT — ANXIETY QUESTIONNAIRES
2. NOT BEING ABLE TO STOP OR CONTROL WORRYING: SEVERAL DAYS
IF YOU CHECKED OFF ANY PROBLEMS ON THIS QUESTIONNAIRE, HOW DIFFICULT HAVE THESE PROBLEMS MADE IT FOR YOU TO DO YOUR WORK, TAKE CARE OF THINGS AT HOME, OR GET ALONG WITH OTHER PEOPLE: SOMEWHAT DIFFICULT
6. BECOMING EASILY ANNOYED OR IRRITABLE: NEARLY EVERY DAY
7. FEELING AFRAID AS IF SOMETHING AWFUL MIGHT HAPPEN: NOT AT ALL
5. BEING SO RESTLESS THAT IT IS HARD TO SIT STILL: SEVERAL DAYS
3. WORRYING TOO MUCH ABOUT DIFFERENT THINGS: NEARLY EVERY DAY
GAD7 TOTAL SCORE: 13
GAD7 TOTAL SCORE: 13
1. FEELING NERVOUS, ANXIOUS, OR ON EDGE: MORE THAN HALF THE DAYS

## 2023-05-15 NOTE — NURSING NOTE
Chief Complaint   Patient presents with     Physical     Fasting cpx         Pre-visit Screening:  Immunizations:  up to date  Colonoscopy:  is up to date  Mammogram: is up to date  Asthma Action Test/Plan:  NA  PHQ9:  NA  GAD7:  NA  Questioned patient about current smoking habits Pt. as a teenager she tried it.  Ok to leave detailed message on voice mail for today's visit only Yes, phone # [phone]

## 2024-01-05 ENCOUNTER — TELEPHONE (OUTPATIENT)
Dept: CARDIOLOGY | Facility: CLINIC | Age: 69
End: 2024-01-05
Payer: COMMERCIAL

## 2024-01-05 NOTE — TELEPHONE ENCOUNTER
[facility] Call Center    Phone Message    May a detailed message be left on voicemail: yes     Reason for Call: Symptoms or Concerns     If patient has red-flag symptoms, warm transfer to triage line    Current symptom or concern: Heart Racing at 3:30pm yesterday 1/4/24    Symptoms have been present for:  about an 1 hour    Has patient previously been seen for this? No    By:     Date:     Are there any new or worsening symptoms? Yes: New     Action Taken: Other: cardiology     Travel Screening: Not Applicable    Thank you!  [facility]

## 2024-02-29 ENCOUNTER — LAB (OUTPATIENT)
Dept: LAB | Facility: CLINIC | Age: 69
End: 2024-02-29
Payer: COMMERCIAL

## 2024-02-29 ENCOUNTER — OFFICE VISIT (OUTPATIENT)
Dept: CARDIOLOGY | Facility: CLINIC | Age: 69
End: 2024-02-29
Payer: COMMERCIAL

## 2024-02-29 VITALS
SYSTOLIC BLOOD PRESSURE: 136 MMHG | DIASTOLIC BLOOD PRESSURE: 80 MMHG | WEIGHT: 179.7 LBS | HEART RATE: 70 BPM | HEIGHT: 67 IN | BODY MASS INDEX: 28.2 KG/M2 | OXYGEN SATURATION: 100 %

## 2024-02-29 DIAGNOSIS — R00.2 PALPITATIONS: ICD-10-CM

## 2024-02-29 DIAGNOSIS — R00.2 PALPITATIONS: Primary | ICD-10-CM

## 2024-02-29 LAB
ERYTHROCYTE [DISTWIDTH] IN BLOOD BY AUTOMATED COUNT: 11.1 % (ref 10–15)
HCT VFR BLD AUTO: 41.2 % (ref 35–47)
HGB BLD-MCNC: 14 G/DL (ref 11.7–15.7)
MCH RBC QN AUTO: 32.3 PG (ref 26.5–33)
MCHC RBC AUTO-ENTMCNC: 34 G/DL (ref 31.5–36.5)
MCV RBC AUTO: 95 FL (ref 78–100)
PLATELET # BLD AUTO: 259 10E3/UL (ref 150–450)
RBC # BLD AUTO: 4.34 10E6/UL (ref 3.8–5.2)
TSH SERPL DL<=0.005 MIU/L-ACNC: 2.36 UIU/ML (ref 0.3–4.2)
WBC # BLD AUTO: 4.6 10E3/UL (ref 4–11)

## 2024-02-29 PROCEDURE — 84443 ASSAY THYROID STIM HORMONE: CPT | Performed by: INTERNAL MEDICINE

## 2024-02-29 PROCEDURE — 36415 COLL VENOUS BLD VENIPUNCTURE: CPT | Performed by: INTERNAL MEDICINE

## 2024-02-29 PROCEDURE — 93000 ELECTROCARDIOGRAM COMPLETE: CPT | Performed by: INTERNAL MEDICINE

## 2024-02-29 PROCEDURE — 99214 OFFICE O/P EST MOD 30 MIN: CPT | Performed by: INTERNAL MEDICINE

## 2024-02-29 PROCEDURE — 85027 COMPLETE CBC AUTOMATED: CPT | Performed by: INTERNAL MEDICINE

## 2024-02-29 NOTE — LETTER
"2/29/2024    Anyi Gracia MD  [address]  [facility] [address]    RE: Paulette Griffin       Dear Colleague,     I had the pleasure of seeing Paulette Griffin in the [facility].  [facility] Progress note    Assessment:  Elevated HR on fitbit, incidentally noted. Better with deep breaths - suspect possible paroxysmal SVT.   Left bundle branch block.  Previous Stress test negative for inducible ischemia (\"probably\" negative with lower level of certainty due to underlying LBBB).  White coat hypertension. BP improved on recheck.   Family history of possible clotting disorder.  Mild dyslipidemia, reports recheck lipids with better last fall through a work physical due to exercising more. Results not available for review.     Plan:  Echo  7 day ziopatch  CBC, BMP, TSH with reflex T4 today  Cardiology SHY follow up to review results.    HPI:     Paulette Griffin is a very nice 68 year old yo F with a LBBB and history of bilateral hip arthoplasty. I met her in 2021 prior to hip arthroplasty. She had a stress test which was \"[probably neg for ischemia\" prior to her surgery and now both hips have been replaced and she is very active again and usually feels really good with her daily exercise (aerobic activity and stretching and lifts light weights).  She does [employer] workouts and her daughter is a , so it is a fun/friendly family competition.     Recently she was at work and her fitbit alerted her her HR was 112 while sitting at her desk. It lasted 5-10 min. With deep breathing it improved.  Usually her resting HR is in the 70s. She usually walks around at least once per hour to get her HR up at work.    She has never had palpitations. She is anxious about the tachycardia which was detected on her fitbit. She does not have chest pain.       CURRENT MEDICATIONS:  Current Outpatient Medications   Medication Sig Dispense Refill    acetaminophen (TYLENOL) 500 MG tablet Take 500-1,000 mg by " mouth every 6 hours as needed for mild pain      MELATONIN PO Take 3 mg by mouth nightly as needed      multivitamin w/minerals (THERA-VIT-M) tablet Take 1 tablet by mouth daily         ALLERGIES     Allergies   Allergen Reactions    No Known Allergies        PAST MEDICAL, SURGICAL, FAMILY, SOCIAL HISTORY:  History was reviewed and updated as needed, see medical record.    REVIEW OF SYSTEMS:  Skin:        Eyes:       ENT:       Respiratory:  Negative    Cardiovascular:   negative  Gastroenterology:      Genitourinary:       Musculoskeletal:       Neurologic:       Psychiatric:       Heme/Lymph/Imm:       Endocrine:         PHYSICAL EXAM:      BP: 136/80 Pulse: 70     SpO2: 100 %      Vital Signs with Ranges  Pulse:  [70] 70  BP: (140)/(82) 140/82  SpO2:  [100 %] 100 %  179 lbs 11.2 oz    Constitutional: awake, alert, no distress  Eyes: sclera nonicteric  ENT: trachea midline  Respiratory: clear bilaterally  Cardiovascular: regular rate and rhythm no murmur  GI: nondistended, nontender, bowel sounds present  Skin: dry, no rash no edema  Musculoskeletal: grossly normal muscle bulk and tone  Neurologic: no  gross focal deficits  Neuropsychiatric: normal affect    Recent Lab Results:    I read her EKG today  normal sinus rhythm LBBB    LIPID RESULTS:  Lab Results   Component Value Date    CHOL 212 (A) 05/15/2023    HDL 73 05/15/2023     05/15/2023     03/06/2017    TRIG 62 05/15/2023    CHOLHDLRATIO 3 05/15/2023           CBC RESULTS:  Lab Results   Component Value Date    WBC 7.0 01/17/2023    RBC 4.33 01/17/2023    HGB 14.2 01/17/2023    HCT 43.2 01/17/2023    MCV 99.7 01/17/2023    MCH 32.8 01/17/2023    MCHC 32.9 01/17/2023    RDW 11.2 01/17/2023     01/17/2023       BMP RESULTS:  Lab Results   Component Value Date    .5 05/15/2023    POTASSIUM 4.54 05/15/2023    CHLORIDE 102.5 05/15/2023    CO2 30.3 05/15/2023     (A) 05/15/2023    BUN 12 05/15/2023    BUN 17.6 05/15/2023    CR  0.68 05/15/2023    RODRÍGUEZ 9.5 05/15/2023          Thank you for allowing me to participate in the care of your patient.      Sincerely,     Sarah Alford MD     [facility]  cc:   No referring provider defined for this encounter.

## 2024-02-29 NOTE — PROGRESS NOTES
"[facility] Progress note    Assessment:  Elevated HR on fitbit, incidentally noted. Better with deep breaths - suspect possible paroxysmal SVT.   Left bundle branch block.  Previous Stress test negative for inducible ischemia (\"probably\" negative with lower level of certainty due to underlying LBBB).  White coat hypertension. BP improved on recheck.   Family history of possible clotting disorder.  Mild dyslipidemia, reports recheck lipids with better last fall through a work physical due to exercising more. Results not available for review.     Plan:  Echo  7 day ziopatch  CBC, BMP, TSH with reflex T4 today  Cardiology SHY follow up to review results.    HPI:     Paulette Griffin is a very nice 68 year old yo F with a LBBB and history of bilateral hip arthoplasty. I met her in 2021 prior to hip arthroplasty. She had a stress test which was \"[probably neg for ischemia\" prior to her surgery and now both hips have been replaced and she is very active again and usually feels really good with her daily exercise (aerobic activity and stretching and lifts light weights).  She does [employer] workouts and her daughter is a , so it is a fun/friendly family competition.     Recently she was at work and her fitbit alerted her her HR was 112 while sitting at her desk. It lasted 5-10 min. With deep breathing it improved.  Usually her resting HR is in the 70s. She usually walks around at least once per hour to get her HR up at work.    She has never had palpitations. She is anxious about the tachycardia which was detected on her fitbit. She does not have chest pain.       CURRENT MEDICATIONS:  Current Outpatient Medications   Medication Sig Dispense Refill    acetaminophen (TYLENOL) 500 MG tablet Take 500-1,000 mg by mouth every 6 hours as needed for mild pain      MELATONIN PO Take 3 mg by mouth nightly as needed      multivitamin w/minerals (THERA-VIT-M) tablet Take 1 tablet by mouth daily         ALLERGIES   "   Allergies   Allergen Reactions    No Known Allergies        PAST MEDICAL, SURGICAL, FAMILY, SOCIAL HISTORY:  History was reviewed and updated as needed, see medical record.    REVIEW OF SYSTEMS:  Skin:        Eyes:       ENT:       Respiratory:  Negative    Cardiovascular:   negative  Gastroenterology:      Genitourinary:       Musculoskeletal:       Neurologic:       Psychiatric:       Heme/Lymph/Imm:       Endocrine:         PHYSICAL EXAM:      BP: 136/80 Pulse: 70     SpO2: 100 %      Vital Signs with Ranges  Pulse:  [70] 70  BP: (140)/(82) 140/82  SpO2:  [100 %] 100 %  179 lbs 11.2 oz    Constitutional: awake, alert, no distress  Eyes: sclera nonicteric  ENT: trachea midline  Respiratory: clear bilaterally  Cardiovascular: regular rate and rhythm no murmur  GI: nondistended, nontender, bowel sounds present  Skin: dry, no rash no edema  Musculoskeletal: grossly normal muscle bulk and tone  Neurologic: no  gross focal deficits  Neuropsychiatric: normal affect    Recent Lab Results:    I read her EKG today  normal sinus rhythm LBBB    LIPID RESULTS:  Lab Results   Component Value Date    CHOL 212 (A) 05/15/2023    HDL 73 05/15/2023     05/15/2023     03/06/2017    TRIG 62 05/15/2023    CHOLHDLRATIO 3 05/15/2023           CBC RESULTS:  Lab Results   Component Value Date    WBC 7.0 01/17/2023    RBC 4.33 01/17/2023    HGB 14.2 01/17/2023    HCT 43.2 01/17/2023    MCV 99.7 01/17/2023    MCH 32.8 01/17/2023    MCHC 32.9 01/17/2023    RDW 11.2 01/17/2023     01/17/2023       BMP RESULTS:  Lab Results   Component Value Date    .5 05/15/2023    POTASSIUM 4.54 05/15/2023    CHLORIDE 102.5 05/15/2023    CO2 30.3 05/15/2023     (A) 05/15/2023    BUN 12 05/15/2023    BUN 17.6 05/15/2023    CR 0.68 05/15/2023    RODRÍGUEZ 9.5 05/15/2023

## 2024-03-11 ENCOUNTER — HOSPITAL ENCOUNTER (OUTPATIENT)
Dept: CARDIOLOGY | Facility: CLINIC | Age: 69
Discharge: HOME | End: 2024-03-11
Attending: INTERNAL MEDICINE | Admitting: INTERNAL MEDICINE
Payer: COMMERCIAL

## 2024-03-11 DIAGNOSIS — R00.2 PALPITATIONS: ICD-10-CM

## 2024-03-11 LAB — LVEF ECHO: NORMAL

## 2024-03-11 PROCEDURE — 93306 TTE W/DOPPLER COMPLETE: CPT | Mod: 26 | Performed by: INTERNAL MEDICINE

## 2024-03-11 PROCEDURE — 93306 TTE W/DOPPLER COMPLETE: CPT

## 2024-03-24 PROCEDURE — 93244 EXT ECG>48HR<7D REV&INTERPJ: CPT | Performed by: INTERNAL MEDICINE

## 2024-03-25 ENCOUNTER — TELEPHONE (OUTPATIENT)
Dept: CARDIOLOGY | Facility: CLINIC | Age: 69
End: 2024-03-25
Payer: COMMERCIAL

## 2024-03-25 DIAGNOSIS — I10 BENIGN ESSENTIAL HYPERTENSION: Primary | ICD-10-CM

## 2024-03-25 RX ORDER — METOPROLOL SUCCINATE 25 MG/1
25 TABLET, EXTENDED RELEASE ORAL DAILY
Qty: 90 TABLET | Refills: 3 | Status: SHIPPED | OUTPATIENT
Start: 2024-03-25

## 2024-03-25 NOTE — TELEPHONE ENCOUNTER
Attempted to call patient with Dr. Lorenzana below recommendations. Patient did not answer. Voicemail left with direct call back number.           Echo reassuring, but significant HTN noted.  Antihypertensive rx recommended. Given h/o palps and high HR we can start with metoprolol succinate 25mg PO daily.     Sarah Alford MD on 3/23/2024 at 9:43 AM

## 2024-03-25 NOTE — TELEPHONE ENCOUNTER
Patient returned writers call. Writer reviewed below recommendations from Dr. Alford with patient. Educated patient to take blood pressure 1-2 hours after taking medication and to notify us if blood pressure persists > 140/80. Patient stated will buy blood pressure cuff. Educated patient of possible side effects of dizziness, lightheadedness, fatigue and to notify team if occurs. Patient in agreement with plan and verbalized understanding. Prescription sent to patient preferred pharmacy.       Echo reassuring, but significant HTN noted.  Antihypertensive rx recommended. Given h/o palps and high HR we can start with metoprolol succinate 25mg PO daily.     Sarah Alford MD on 3/23/2024 at 9:43 AM

## 2024-04-29 ENCOUNTER — OFFICE VISIT (OUTPATIENT)
Dept: CARDIOLOGY | Facility: CLINIC | Age: 69
End: 2024-04-29
Attending: INTERNAL MEDICINE
Payer: COMMERCIAL

## 2024-04-29 VITALS
DIASTOLIC BLOOD PRESSURE: 70 MMHG | WEIGHT: 183 LBS | HEIGHT: 67 IN | BODY MASS INDEX: 28.72 KG/M2 | HEART RATE: 54 BPM | SYSTOLIC BLOOD PRESSURE: 120 MMHG

## 2024-04-29 DIAGNOSIS — I44.7 LEFT BUNDLE BRANCH BLOCK: ICD-10-CM

## 2024-04-29 DIAGNOSIS — R00.2 PALPITATIONS: Primary | ICD-10-CM

## 2024-04-29 DIAGNOSIS — I10 BENIGN ESSENTIAL HYPERTENSION: ICD-10-CM

## 2024-04-29 PROCEDURE — 99214 OFFICE O/P EST MOD 30 MIN: CPT | Performed by: NURSE PRACTITIONER

## 2024-04-29 NOTE — PROGRESS NOTES
CARDIOLOGY CLINIC NOTE    PRIMARY CARDIOLOGIST  Dr. Alford     PRIMARY CARE PHYSICIAN:  Anyi Gracia    HISTORY OF PRESENT ILLNESS:  Paulette Griffin is a very pleasant 68 year old female with a PMH significant for LBBB, bilateral hip arthroplasty, and family history of blood disorder.    She was seen in 2021 by Dr. Alford for evaluation of preoperative EKG showing left bundle branch block.  She underwent a nuclear stress test that was negative for inducible myocardial ischemia or infarction.  EF was normal at 70%.  She went on to have successful hip surgery.    She returned to the office on 2/29/2024 for evaluation of rapid heartbeat noted on her Fitbit.  She underwent an echocardiogram that showed a normal ejection fraction estimated at 60 to 65%, normal RV size and function, no valvular disease.  She was noted to be significant hypertensive during her echocardiogram at 174/108.  She was started on low-dose metoprolol succinate 25 mg daily.  Follow-up Zio patch monitor showed primarily sinus rhythm with an average heart rate of 73.  There was 1 5 beat run of VT and 3 runs of SVT with the longest being 5 beats.  2% PAC burden.    She returns to the office today for review of results.  She is feeling well from cardiac standpoint, denies chest pain, shortness of breath, PND, orthopnea, presyncope, syncope, or leg edema.  She has had no recurrence of palpitations since starting metoprolol.    Blood pressure is well-controlled at 120/70, heart rate 54  Last BMP and CBC were unremarkable, TSH normal at 2.36  She does not smoke, uses alcohol occasionally  She works out regularly 3 times a week.    Compliant with medication.  PAST MEDICAL HISTORY:  No past medical history on file.    MEDICATIONS:  Current Outpatient Medications   Medication Sig Dispense Refill    acetaminophen (TYLENOL) 500 MG tablet Take 500-1,000 mg by mouth every 6 hours as needed for mild pain      metoprolol succinate ER (TOPROL XL) 25 MG 24  hr tablet Take 1 tablet (25 mg) by mouth daily 90 tablet 3    multivitamin w/minerals (THERA-VIT-M) tablet Take 1 tablet by mouth daily      MELATONIN PO Take 3 mg by mouth nightly as needed (Patient not taking: Reported on 4/29/2024)       No current facility-administered medications for this visit.       SOCIAL HISTORY:  I have reviewed this patient's social history and updated it with pertinent information if needed. Paulette Griffin  reports that she has quit smoking. She has been exposed to tobacco smoke. She has never used smokeless tobacco. She reports current alcohol use of about 1.0 standard drink of alcohol per week. She reports that she does not use drugs.    PHYSICAL EXAM:  Pulse:  [54] 54  BP: (120)/(70) 120/70  183 lbs 0 oz    Constitutional: alert, no distress  Respiratory: Good bilateral air entry  Cardiovascular: Regular rate and rhythm  GI: nondistended  Neuropsychiatric: appropriate affact    ASSESSMENT/PLAN:  Pertinent issues addressed/ reviewed during this cardiology visit  Tachycardia -Zio patch monitor showed average heart rate of 73, 5 BUN of VT and 3 runs of SVT with the longest being 5 beats.  No further recurrence.  Continue metoprolol  Left bundle branch block -chronic.  Nuclear stress test in 2021 was negative for ischemia.  Hypertension -well-controlled, continue metoprolol.  Strict low-sodium diet.  Exercise and weight loss.    It was a pleasure seeing this patient in clinic today. Please do not hesitate to contact me with any future questions.     MORGAN Jewell, CNP  Cardiology - [facility] [place] [facility]  04/29/2024    Review of the result(s) of each unique test - Last echocardiogram, Zio patch monitor, BMP and CBC.     The level of medical decision making during this visit was of moderate complexity.    This note was completed in part using dictation via the Dragon voice recognition software. Some word and grammatical errors may occur and must be interpreted in the appropriate clinical  context.  If there are any questions pertaining to this issue, please contact me for further clarification.

## 2024-04-29 NOTE — PATIENT INSTRUCTIONS
Thanks for participating in a office visit with the [facility] today.    Reviewed results of echocardiogram and zio patch monitor. Heart rates well controlled.   Blood pressures well controlled.   Continue Metoprolol  Encourage exercise and strict low salt diet.  Alcohol in moderation    Follow up in 1 year with SHY     Please call my nurse at  [phone] with any questions or concerns.    Scheduling phone number: [phone]  Reminder: Please bring in all current medications, over the counter supplements and vitamin bottles to your next appointment.

## 2024-04-29 NOTE — LETTER
4/29/2024    Anyi Gracia MD  [address] [facility] [address]    RE: Paulette Griffin       Dear Colleague,     I had the pleasure of seeing Paulette Griffin in the [facility].  CARDIOLOGY CLINIC NOTE    PRIMARY CARDIOLOGIST  Dr. Alford     PRIMARY CARE PHYSICIAN:  Anyi Gracia    HISTORY OF PRESENT ILLNESS:  Paulette Griffin is a very pleasant 68 year old female with a PMH significant for LBBB, bilateral hip arthroplasty, and family history of blood disorder.    She was seen in 2021 by Dr. Alford for evaluation of preoperative EKG showing left bundle branch block.  She underwent a nuclear stress test that was negative for inducible myocardial ischemia or infarction.  EF was normal at 70%.  She went on to have successful hip surgery.    She returned to the office on 2/29/2024 for evaluation of rapid heartbeat noted on her Fitbit.  She underwent an echocardiogram that showed a normal ejection fraction estimated at 60 to 65%, normal RV size and function, no valvular disease.  She was noted to be significant hypertensive during her echocardiogram at 174/108.  She was started on low-dose metoprolol succinate 25 mg daily.  Follow-up Zio patch monitor showed primarily sinus rhythm with an average heart rate of 73.  There was 1 5 beat run of VT and 3 runs of SVT with the longest being 5 beats.  2% PAC burden.    She returns to the office today for review of results.  She is feeling well from cardiac standpoint, denies chest pain, shortness of breath, PND, orthopnea, presyncope, syncope, or leg edema.  She has had no recurrence of palpitations since starting metoprolol.    Blood pressure is well-controlled at 120/70, heart rate 54  Last BMP and CBC were unremarkable, TSH normal at 2.36  She does not smoke, uses alcohol occasionally  She works out regularly 3 times a week.    Compliant with medication.  PAST MEDICAL HISTORY:  No past medical history on file.    MEDICATIONS:  Current  Outpatient Medications   Medication Sig Dispense Refill    acetaminophen (TYLENOL) 500 MG tablet Take 500-1,000 mg by mouth every 6 hours as needed for mild pain      metoprolol succinate ER (TOPROL XL) 25 MG 24 hr tablet Take 1 tablet (25 mg) by mouth daily 90 tablet 3    multivitamin w/minerals (THERA-VIT-M) tablet Take 1 tablet by mouth daily      MELATONIN PO Take 3 mg by mouth nightly as needed (Patient not taking: Reported on 4/29/2024)       No current facility-administered medications for this visit.       SOCIAL HISTORY:  I have reviewed this patient's social history and updated it with pertinent information if needed. Paulette Griffin  reports that she has quit smoking. She has been exposed to tobacco smoke. She has never used smokeless tobacco. She reports current alcohol use of about 1.0 standard drink of alcohol per week. She reports that she does not use drugs.    PHYSICAL EXAM:  Pulse:  [54] 54  BP: (120)/(70) 120/70  183 lbs 0 oz    Constitutional: alert, no distress  Respiratory: Good bilateral air entry  Cardiovascular: Regular rate and rhythm  GI: nondistended  Neuropsychiatric: appropriate affact    ASSESSMENT/PLAN:  Pertinent issues addressed/ reviewed during this cardiology visit  Tachycardia -Zio patch monitor showed average heart rate of 73, 5 BUN of VT and 3 runs of SVT with the longest being 5 beats.  No further recurrence.  Continue metoprolol  Left bundle branch block -chronic.  Nuclear stress test in 2021 was negative for ischemia.  Hypertension -well-controlled, continue metoprolol.  Strict low-sodium diet.  Exercise and weight loss.    It was a pleasure seeing this patient in clinic today. Please do not hesitate to contact me with any future questions.     MORGAN Jewell, CNP  Cardiology - [facility]  04/29/2024    Review of the result(s) of each unique test - Last echocardiogram, Zio patch monitor, BMP and CBC.     The level of medical decision making during this visit was of  moderate complexity.    This note was completed in part using dictation via the Dragon voice recognition software. Some word and grammatical errors may occur and must be interpreted in the appropriate clinical context.  If there are any questions pertaining to this issue, please contact me for further clarification.      Thank you for allowing me to participate in the care of your patient.      Sincerely,     MORGAN Jewell [facility]  cc:   Sarah Alford MD  [address] [id number]

## 2024-08-03 ENCOUNTER — HEALTH MAINTENANCE LETTER (OUTPATIENT)
Age: 69
End: 2024-08-03

## 2025-01-20 ENCOUNTER — OFFICE VISIT (OUTPATIENT)
Dept: FAMILY MEDICINE | Facility: CLINIC | Age: 70
End: 2025-01-20

## 2025-01-20 VITALS
BODY MASS INDEX: 29.91 KG/M2 | TEMPERATURE: 97.2 F | HEART RATE: 72 BPM | DIASTOLIC BLOOD PRESSURE: 90 MMHG | SYSTOLIC BLOOD PRESSURE: 134 MMHG | WEIGHT: 191 LBS | OXYGEN SATURATION: 97 %

## 2025-01-20 DIAGNOSIS — J06.9 VIRAL UPPER RESPIRATORY INFECTION: Primary | ICD-10-CM

## 2025-01-20 PROCEDURE — 99213 OFFICE O/P EST LOW 20 MIN: CPT | Performed by: PHYSICIAN ASSISTANT

## 2025-01-20 RX ORDER — BENZONATATE 200 MG/1
200 CAPSULE ORAL 3 TIMES DAILY PRN
Qty: 30 CAPSULE | Refills: 0 | Status: SHIPPED | OUTPATIENT
Start: 2025-01-20

## 2025-01-20 RX ORDER — ALBUTEROL SULFATE 90 UG/1
2 INHALANT RESPIRATORY (INHALATION) EVERY 6 HOURS PRN
Qty: 18 G | Refills: 0 | Status: SHIPPED | OUTPATIENT
Start: 2025-01-20

## 2025-01-20 NOTE — NURSING NOTE
Chief Complaint   Patient presents with    Cough     Cough and drainage, lots of congestion in the throat. Started 7 days ago. No fever. Cough worse with activity     Pre-visit Screening:  Immunizations:  up to date  Colonoscopy:  is up to date  Mammogram: is up to date  Asthma Action Test/Plan:  na  PHQ9:  na  GAD7:  n  Questioned patient about current smoking habits Pt. quit smoking some time ago.  Ok to leave detailed message on voice mail for today's visit only yes, phone # [phone] (home)

## 2025-01-20 NOTE — PROGRESS NOTES
Assessment & Plan     Viral upper respiratory infection-some wheezing on exam, low concern for bacterial process, symptomatic care for now  -Rest, increase fluids, honey for cough suppression/sore throat  -Add Mucinex and Sudafed D for symptomatic relief  -Ibuprofen/Tylenol as needed for symptomatic relief  Seek emergency care for significant shortness of breath, chest pain, and/or fever >103F that cannot be controlled with antipyretics     - benzonatate (TESSALON) 200 MG capsule  Dispense: 30 capsule; Refill: 0  - albuterol (PROAIR HFA/PROVENTIL HFA/VENTOLIN HFA) 108 (90 Base) MCG/ACT inhaler  Dispense: 18 g; Refill: 0          Follow up as needed    No follow-ups on file.    Subjective   Karlene is a 69 year old, presenting for the following health issues:  Cough (Cough and drainage, lots of congestion in the throat. Started 7 days ago. No fever. Cough worse with activity)    HPI     Pt presents with sick symptoms.  is currently ill. Ill for 9 days. Started with sore throat, progressed to a cough (severe, worsens through the day), not sleeping well due to the cough. No fevers, some chills, no SOB. Lots of upper chest symptoms.     Using cough drops, Zyrtec D, hydration to help symptoms          Review of Systems  Constitutional, neuro, ENT, endocrine, pulmonary, cardiac, gastrointestinal, genitourinary, musculoskeletal, integument and psychiatric systems are negative, except as otherwise noted.      Objective    BP (!) 134/90 (BP Location: Left arm, Patient Position: Sitting, Cuff Size: Adult Regular)   Pulse 72   Temp 97.2  F (36.2  C) (Temporal)   Wt 86.6 kg (191 lb)   LMP 08/31/2012   SpO2 97%   BMI 29.91 kg/m    Body mass index is 29.91 kg/m .  Physical Exam   GENERAL: alert and no distress  HENT: ear canals and TM's normal, nose and mouth without ulcers or lesions  NECK: no adenopathy, no asymmetry, masses, or scars  RESP: expiratory wheezes throughout and inspiratory wheezes throughout  CV:  regular rate and rhythm, normal S1 S2, no S3 or S4, no murmur, click or rub, no peripheral edema  ABDOMEN: soft, nontender, no hepatosplenomegaly, no masses and bowel sounds normal  MS: no gross musculoskeletal defects noted, no edema  NEURO: Normal strength and tone, mentation intact and speech normal  PSYCH: mentation appears normal, affect normal/bright            Signed Electronically by: Dwight Castaneda PA-C

## 2025-02-11 DIAGNOSIS — J06.9 VIRAL UPPER RESPIRATORY INFECTION: ICD-10-CM

## 2025-02-11 NOTE — TELEPHONE ENCOUNTER
Pending Prescriptions:                       Disp   Refills    albuterol (PROAIR HFA/PROVENTIL HFA/CHRISTA*                    Sig: INHALE 2 PUFFS INTO THE LUNGS EVERY 6 HOURS AS           NEEDED FOR SHORTNESS OF BREATH, WHEEZING OR           COUGH.     Dwight please review:    Was this for the cough?    Fax or deny   Thank you  Kecia

## 2025-02-12 RX ORDER — ALBUTEROL SULFATE 90 UG/1
2 INHALANT RESPIRATORY (INHALATION) EVERY 6 HOURS PRN
OUTPATIENT
Start: 2025-02-12

## 2025-03-10 DIAGNOSIS — I10 BENIGN ESSENTIAL HYPERTENSION: ICD-10-CM

## 2025-03-10 RX ORDER — METOPROLOL SUCCINATE 25 MG/1
25 TABLET, EXTENDED RELEASE ORAL DAILY
Qty: 90 TABLET | Refills: 0 | Status: SHIPPED | OUTPATIENT
Start: 2025-03-10

## 2025-03-22 ENCOUNTER — HEALTH MAINTENANCE LETTER (OUTPATIENT)
Age: 70
End: 2025-03-22

## 2025-03-28 NOTE — PROGRESS NOTES
Preventive Care Visit  [facility], [facility].  Anyi Gracia MD, Family Medicine  Mar 31, 2025       SUBJECTIVE:   Karlene is a 69 year old, presenting for the following:  Physical (Fasting today)      HPI      Here for a physical. Is fasting.  Has flup apt later this month with cardiology. Newly on medicare.              Social History     Tobacco Use    Smoking status: Former     Passive exposure: Past    Smokeless tobacco: Never    Tobacco comments:     teenager   Substance Use Topics    Alcohol use: Yes     Alcohol/week: 1.0 standard drink of alcohol     Types: 1 Glasses of wine per week     Comment: occasionally              No data to display            No concerns  Reviewed orders with patient.  Reviewed health maintenance and updated orders accordingly - Yes  BP Readings from Last 3 Encounters:   03/31/25 128/78   01/20/25 (!) 134/90   04/29/24 120/70    Wt Readings from Last 3 Encounters:   03/31/25 86.6 kg (191 lb)   01/20/25 86.6 kg (191 lb)   04/29/24 83 kg (183 lb)                  Patient Active Problem List   Diagnosis    Ovarian cyst    Herpes simplex virus (HSV) infection    ACP (advance care planning)    Family history of clotting disorder    Benign essential hypertension    Left bundle branch block     Past Surgical History:   Procedure Laterality Date    HC REMOVAL OF TONSILS,<11 Y/O      Tonsillectomy, under 12 yrs    HYSTEROSCOPY  07/01/2015    polyp    JOINT REPLACEMENT, HIP RT/LT Left 11/2021    TCO       Social History     Tobacco Use    Smoking status: Former     Passive exposure: Past    Smokeless tobacco: Never    Tobacco comments:     teenager   Substance Use Topics    Alcohol use: Yes     Alcohol/week: 1.0 standard drink of alcohol     Types: 1 Glasses of wine per week     Comment: occasionally     Family History   Problem Relation Age of Onset    Lung Cancer Mother     Heart Disease Father         cabg x 4    Dementia Father     Diabetes Type 2  Father     Deep Vein  "Thrombosis Father     Cancer Maternal Aunt         bone    Cancer Paternal Aunt         primary site unknown    Diabetes Paternal Aunt     Diabetes Paternal Uncle     Colon Cancer Paternal Uncle         70s    Alzheimer Disease No family hx of     Breast Cancer No family hx of          Current Outpatient Medications   Medication Sig Dispense Refill    MELATONIN PO Take 3 mg by mouth nightly as needed.      metoprolol succinate ER (TOPROL XL) 25 MG 24 hr tablet Take 1 tablet (25 mg) by mouth daily. 90 tablet 0    multivitamin w/minerals (THERA-VIT-M) tablet Take 1 tablet by mouth daily         Breast Cancer Screening:    FHS-7:        No data to display              Mammogram referral done.    Pertinent mammograms are reviewed under the imaging tab.      History of abnormal Pap smear: history normal.            Reviewed and updated as needed this visit by clinical staff   Tobacco  Allergies               Reviewed and updated as needed this visit by Provider                  No past medical history on file.   Past Surgical History:   Procedure Laterality Date    HC REMOVAL OF TONSILS,<11 Y/O      Tonsillectomy, under 12 yrs    HYSTEROSCOPY  07/01/2015    polyp    JOINT REPLACEMENT, HIP RT/LT Left 11/2021    TCO     Review of Systems    Review of Systems  Constitutional, HEENT, cardiovascular, pulmonary, gi and gu systems are negative, except as otherwise noted.    OBJECTIVE:   /78 (BP Location: Right arm, Patient Position: Sitting, Cuff Size: Adult Large)   Pulse 67   Temp 98.1  F (36.7  C) (Temporal)   Ht 1.702 m (5' 7\")   Wt 86.6 kg (191 lb)   LMP 08/31/2012   SpO2 97%   BMI 29.91 kg/m     Estimated body mass index is 29.91 kg/m  as calculated from the following:    Height as of this encounter: 1.702 m (5' 7\").    Weight as of this encounter: 86.6 kg (191 lb).  Physical Exam  GENERAL: alert and no distress  EYES: Eyes grossly normal to inspection, PERRL and conjunctivae and sclerae normal  HENT: ear " "canals and TM's normal, nose and mouth without ulcers or lesions  NECK: no adenopathy, no asymmetry, masses, or scars  RESP: lungs clear to auscultation - no rales, rhonchi or wheezes  BREAST: normal without masses, tenderness or nipple discharge and no palpable axillary masses or adenopathy  CV: regular rate and rhythm, normal S1 S2, no S3 or S4, no murmur, click or rub, no peripheral edema  ABDOMEN: soft, nontender, no hepatosplenomegaly, no masses and bowel sounds normal  MS: no gross musculoskeletal defects noted, no edema  SKIN: no suspicious lesions or rashes  NEURO: Normal strength and tone, mentation intact and speech normal  PSYCH: mentation appears normal, affect normal/bright  Pelvic: declined    Diagnostic Test Results:  Labs reviewed in Epic  No results found for any visits on 03/31/25.    ASSESSMENT/PLAN:   1. Encounter for routine history and physical exam in female patient (Primary)    - VENOUS COLLECTION  - Lipid Panel ([facility])  - Basic Metabolic Panel ([facility])    2. Visit for screening mammogram    - MA Screening Bilateral w/ Mansoor  - [facility] ([facility])    3. Welcome to Medicare preventive visit  Completed     Patient has been advised of split billing requirements and indicates understanding: Yes      Counseling  Reviewed preventive health counseling, as reflected in patient instructions       Regular exercise       Healthy diet/nutrition      BMI  Estimated body mass index is 29.91 kg/m  as calculated from the following:    Height as of this encounter: 1.702 m (5' 7\").    Weight as of this encounter: 86.6 kg (191 lb).         She reports that she has quit smoking. She has been exposed to tobacco smoke. She has never used smokeless tobacco.          Signed Electronically by: Anyi Gracia MD  "

## 2025-03-31 ENCOUNTER — OFFICE VISIT (OUTPATIENT)
Dept: FAMILY MEDICINE | Facility: CLINIC | Age: 70
End: 2025-03-31

## 2025-03-31 VITALS
OXYGEN SATURATION: 97 % | WEIGHT: 191 LBS | DIASTOLIC BLOOD PRESSURE: 78 MMHG | HEIGHT: 67 IN | HEART RATE: 67 BPM | TEMPERATURE: 98.1 F | SYSTOLIC BLOOD PRESSURE: 128 MMHG | BODY MASS INDEX: 29.98 KG/M2

## 2025-03-31 DIAGNOSIS — Z00.00 WELCOME TO MEDICARE PREVENTIVE VISIT: ICD-10-CM

## 2025-03-31 DIAGNOSIS — Z00.00 ENCOUNTER FOR ROUTINE HISTORY AND PHYSICAL EXAM IN FEMALE PATIENT: Primary | ICD-10-CM

## 2025-03-31 DIAGNOSIS — Z12.31 VISIT FOR SCREENING MAMMOGRAM: ICD-10-CM

## 2025-03-31 LAB
BUN SERPL-MCNC: 17 MG/DL (ref 7–25)
BUN/CREATININE RATIO: 20 (ref 6–32)
CALCIUM SERPL-MCNC: 9.6 MG/DL (ref 8.6–10.3)
CHLORIDE SERPLBLD-SCNC: 111.1 MMOL/L (ref 98–110)
CHOLEST SERPL-MCNC: 208 MG/DL (ref 0–199)
CHOLEST/HDLC SERPL: 3 {RATIO} (ref 0–5)
CO2 SERPL-SCNC: 24.3 MMOL/L (ref 20–32)
CREAT SERPL-MCNC: 0.84 MG/DL (ref 0.6–1.3)
GLUCOSE SERPL-MCNC: 104 MG/DL (ref 60–99)
HDLC SERPL-MCNC: 75 MG/DL (ref 40–150)
LDLC SERPL CALC-MCNC: 122 MG/DL (ref 0–129)
POTASSIUM SERPL-SCNC: 4.33 MMOL/L (ref 3.5–5.3)
SODIUM SERPL-SCNC: 139 MMOL/L (ref 135–146)
TRIGL SERPL-MCNC: 56 MG/DL (ref 0–149)

## 2025-03-31 PROCEDURE — 80061 LIPID PANEL: CPT | Performed by: FAMILY MEDICINE

## 2025-03-31 PROCEDURE — G0402 INITIAL PREVENTIVE EXAM: HCPCS | Performed by: FAMILY MEDICINE

## 2025-03-31 PROCEDURE — 80048 BASIC METABOLIC PNL TOTAL CA: CPT | Performed by: FAMILY MEDICINE

## 2025-03-31 PROCEDURE — 36415 COLL VENOUS BLD VENIPUNCTURE: CPT | Performed by: FAMILY MEDICINE

## 2025-03-31 PROCEDURE — 99397 PER PM REEVAL EST PAT 65+ YR: CPT | Mod: 25 | Performed by: FAMILY MEDICINE

## 2025-03-31 NOTE — PROGRESS NOTES
SUBJECTIVE:                                                                                Paulette Griffin is a 69 year old female who presents for a Welcome to Medicare Visit.      All Histories reviewed and updated in EPIC as appropriate.    Visual Acuity:  Right Eye: 20/20   Left Eye: 20/32      Social History     Tobacco Use    Smoking status: Former     Passive exposure: Past    Smokeless tobacco: Never    Tobacco comments:     teenager   Substance Use Topics    Alcohol use: Yes     Alcohol/week: 1.0 standard drink of alcohol     Types: 1 Glasses of wine per week     Comment: occasionally       Diet: regular, low salt/low fat  Physical Activity: patient exercises 3-4 times weekly    The patient does not drink >3 drinks per day nor >7 drinks per week.      Today's PHQ-2 Score: 0    Do you have a Health Care Directive? no    Current providers sharing in care for this patient include:   Patient Care Team:  Anyi Gracia MD as PCP - General (Family Medicine)  Anyi Gracia MD as Assigned PCP  Sarah Alford MD as MD (Cardiovascular Disease)  Kia Soto APRN CNP as Assigned Heart and Vascular Provider  Estephania Cervantes PA-C as Physician Assistant (Cardiovascular Disease)    Hearing impairment: No hearing concerns  Ability to successfully perform activities of daily living: Yes, no assistance needed  Fall risk:   Fallen 2 or more times in the past year?: No  Home safety:  none identified      The following health maintenance items are reviewed in Epic and correct as of today:  Health Maintenance   Topic Date Due    RSV VACCINE (1 - Risk 60-74 years 1-dose series) Never done    MEDICARE ANNUAL WELLNESS VISIT  05/15/2024    BMP  05/15/2024    COVID-19 Vaccine (7 - 2024-25 season) 03/26/2025    FALL RISK ASSESSMENT  03/31/2026    DEXA  04/12/2026    MAMMO SCREENING  04/12/2026    DIABETES SCREENING  05/15/2026    ADVANCE CARE PLANNING  01/17/2028    LIPID  05/15/2028    COLORECTAL CANCER SCREENING   2031    DTAP/TDAP/TD IMMUNIZATION (3 - Td or Tdap) 2032    HEPATITIS C SCREENING  Completed    PHQ-2 (once per calendar year)  Completed    INFLUENZA VACCINE  Completed    Pneumococcal Vaccine: 50+ Years  Completed    ZOSTER IMMUNIZATION  Completed    HPV IMMUNIZATION  Aged Out    MENINGITIS IMMUNIZATION  Aged Out    PAP  Discontinued    LUNG CANCER SCREENING  Discontinued       ROS:  Constitutional, HEENT, cardiovascular, pulmonary, gi and gu systems are negative, except as otherwise noted.      SCREENING FOR PREVENTION and EARLY DETECTION     ECG (if done)not performed    Corrected Visual acuity: L 20/32   R 20/20  Screening Lipid Level (covered every 5 years ): Recommended and patient accepted testing.  Cervical cancer screening:  Covered  until age 70 every 2 years unless high risk):  Testing not indicated   HIV screening (at risk ):  Testing not indicated   Colon CA Screening (>50-75 ) (FITT annually or colonoscopy every 10years):  up to date  Breast CA Screenin-2 years 50-74years:  Recommended and patient accepted testing., Dexa Scan (>65 yrs) (covered every 2 years):  up to date, and Diabetes Screening : FBG covered if at risk (obesity, HTN, dyslipidemia, FH): Recommended and patient accepted testing.    CV Risk based on Pooled Cohort Risk:   Pooled Cohort Risk Calculator    Advanced Directives: Discussed and patient desires to .     Immunization History   Administered Date(s) Administered    COVID-19 12+ (Pfizer) 10/03/2023, 2024    COVID-19 Bivalent 12+ (Pfizer) 12/15/2022    COVID-19 MONOVALENT 12+ (Pfizer) 2021, 2021, 2021    DT (PEDS <7y) 1986    Flu 65+ (Fluad) 10/09/2024    Flu, Unspecified 2020, 2024    Hepatitis A (VAQTA)(ADULT 19+) 2025    Hepatitis B, Adult (Energix-B/Recombivax HB) 2025    Influenza (IIV3) PF 10/19/1996, 10/01/2014    Influenza Vaccine 65+ (Fluzone HD) 12/15/2022    Influenza,INJ,MDCK,PF,Quad >6mo(Flucelvax)  "10/28/2019, 10/30/2020, 11/04/2021    Pneumococcal 20 valent Conjugate (Prevnar 20) 12/31/2022, 02/17/2025    TD,PF 7+ (Tenivac) 11/05/1996, 11/25/2006    TDAP (Adacel,Boostrix) 12/29/2022    TDAP Vaccine (Boostrix) 10/10/2012    Zoster recombinant adjuvanted (Shingrix) 03/06/2021, 08/06/2021     Reviewed Immunization Record Today  Pneumoccocal Vaccine: up to date  Varicella Vaccine: {SMI YES/NO/DECLINES:361377  TDaP: up to date      OBJECTIVE:                                                                                    Vitals: /78 (BP Location: Right arm, Patient Position: Sitting, Cuff Size: Adult Large)   Pulse 67   Temp 98.1  F (36.7  C) (Temporal)   Ht 1.702 m (5' 7\")   Wt 86.6 kg (191 lb)   LMP 08/31/2012   SpO2 97%   BMI 29.91 kg/m    BMI= Body mass index is 29.91 kg/m .    EXAM:   See other note    ASSESSMENT/PLAN:                                                                  See other note    End of Life Planning:   Patient currently has an advanced directive: No.  I have verified the patient's ablity to prepare an advanced directive/make health care decisions.  Literature was provided to assist patient in preparing an advanced directive.    COUNSELING:  Reviewed preventive health counseling, as reflected in patient instructions       Regular exercise       Healthy diet/nutrition  Estimated body mass index is 29.91 kg/m  as calculated from the following:    Height as of this encounter: 1.702 m (5' 7\").    Weight as of this encounter: 86.6 kg (191 lb).     reports that she has quit smoking. She has been exposed to tobacco smoke. She has never used smokeless tobacco.      Appropriate preventive services were discussed with this patient, including applicable screening as appropriate for cardiovascular disease, diabetes, osteopenia/osteoporosis, and glaucoma.  As appropriate for age/gender, discussed screening for colorectal cancer, prostate cancer, breast cancer, and cervical cancer. " Checklist reviewing preventive services available has been given to the patient.    Reviewed patients plan of care and provided an AVS. The Basic Care Plan (routine screening as documented in Health Maintenance) for Paulette meets the Care Plan requirement. This Care Plan has been established and reviewed with the Patient.    Anyi Gracia MD  [facility]

## 2025-03-31 NOTE — NURSING NOTE
Chief Complaint   Patient presents with    Physical     Fasting today     Pre-visit Screening:  Immunizations:  up to date  Colonoscopy:  is up to date  Mammogram: is due and ordered today  Asthma Action Test/Plan:  NA  PHQ9:  NA  GAD7:  NA  Questioned patient about current smoking habits Pt. quit smoking some time ago.  Ok to leave detailed message on voice mail for today's visit only Yes, phone # [phone]

## 2025-04-23 NOTE — PROGRESS NOTES
Cardiology Clinic Progress Note  Paulette Griffin MRN# [id number]   YOB: 1955 Age: 69 year old         Primary Cardiologist: Dr. Alford      Assessment:     1.  PSVT- -Zio patch monitor showed average heart rate of 73, a 5 beat of NSVT (possibly SVT) and 3 runs of SVT with the longest being 5 beats.  No further recurrence, asymptomatic.  Continue Toprol 25 mg daily in the evening. A bit bradycardia but asymptomatic.     2. LBBB- Nuclear stress test in 2021 was negative for ischemia.     3. HTN- controlled on Toprol 25 mg daily         Plan:   - continue Metoprolol 25 mg daily   - follow up in 1 year, or sooner if new / worsening symptoms       [facility]        HPI:   Paulette Griffin is a very pleasant 69 year old female with PMH significant for LBBB, bilateral hip arthroplasty, and family history of blood disorder    She was seen in 2021 by Dr. Alford for evaluation of preoperative EKG showing left bundle branch block.  She underwent a nuclear stress test that was negative for inducible myocardial ischemia or infarction.  EF was normal at 70%.  She went on to have successful hip surgery.     In 2/29/2024 she presented for evaluation of rapid heartbeat noted on her Fitbit. She underwent an echocardiogram that showed a normal ejection fraction estimated at 60 to 65%, normal RV size and function, no valvular disease. She was noted to be significant hypertensive during her echocardiogram at 174/108. She was started on low-dose metoprolol succinate 25 mg daily. Follow-up Zio patch monitor showed primarily sinus rhythm with an average heart rate of 73. There was a 5 beat run of VT and 3 runs of SVT with the longest being 5 beats. 2% PAC burden.     Patient presents today for follow up in cardiology clinic and was last seen by Kia Eugene NP in 4/2024 at which time she was doing well on Metoprolol.     Today, patient reports she continues to do well. She  "takes Metoprolol in the evening. She has not had any cardiac symptoms whatsoever. She remains physically active and exercises 5 times a week (walking, elliptical, weights). She has an upcoming trip to [place] and [place] planned in May. She works in [employer].           ROS:   Please see pertinent positives in HPI      Medications:   Current Outpatient Medications   Medication Sig Dispense Refill    MELATONIN PO Take 3 mg by mouth nightly as needed.      metoprolol succinate ER (TOPROL XL) 25 MG 24 hr tablet Take 1 tablet (25 mg) by mouth daily. 90 tablet 0    multivitamin w/minerals (THERA-VIT-M) tablet Take 1 tablet by mouth daily           Physical Exam:     Vitals: /82 (BP Location: Right arm, Patient Position: Sitting, Cuff Size: Adult Regular)   Pulse 57   Ht 1.702 m (5' 7\")   Wt 86.7 kg (191 lb 3.2 oz)   LMP 08/31/2012   SpO2 99%   BMI 29.95 kg/m       Wt Readings from Last 4 Encounters:   03/31/25 86.6 kg (191 lb)   01/20/25 86.6 kg (191 lb)   04/29/24 83 kg (183 lb)   02/29/24 81.5 kg (179 lb 11.2 oz)       GEN: NAD  C/V:  Regular rate and rhythm, no murmur.    RESP: Respirations are unlabored. Clear to auscultation bilaterally without wheezing, rales, or rhonchi.  EXT: No LE edema.      Data:     Last lab work personally reviewed     Echo: 3/2024  Left ventricular size, global systolic function, and wall motion are normal,  estimated LVEF 60-65%.  Right ventricular global function is normal.  No significant valvular abnormalities.  The inferior vena cava was normal in size with preserved respiratory  variability.  Hypertensive for the study:  Start of test BP: 174/108. End of test 164/102. No symptoms reported.    Stress Test: 2021    The nuclear stress test is probably negative for inducible myocardial ischemia or infarction.    Nuclear Study Quality: Small, distal anteroseptal defect, suspect this is secondary to left bundle branch block.  Less likely would be small area of infarct.    The left " ventricular ejection fraction at stress is greater than 70%.    There is no prior study for comparison.    Cleo 3/2024  5-beat VT run. (Suspect SVT)  Occasional PACs ~2%.  No symptoms reported.

## 2025-04-28 ENCOUNTER — OFFICE VISIT (OUTPATIENT)
Dept: CARDIOLOGY | Facility: CLINIC | Age: 70
End: 2025-04-28
Attending: NURSE PRACTITIONER
Payer: COMMERCIAL

## 2025-04-28 VITALS
WEIGHT: 191.2 LBS | HEIGHT: 67 IN | SYSTOLIC BLOOD PRESSURE: 126 MMHG | HEART RATE: 57 BPM | DIASTOLIC BLOOD PRESSURE: 82 MMHG | BODY MASS INDEX: 30.01 KG/M2 | OXYGEN SATURATION: 99 %

## 2025-04-28 DIAGNOSIS — I10 BENIGN ESSENTIAL HYPERTENSION: ICD-10-CM

## 2025-04-28 DIAGNOSIS — R00.2 PALPITATIONS: ICD-10-CM

## 2025-04-28 PROCEDURE — 99214 OFFICE O/P EST MOD 30 MIN: CPT | Performed by: PHYSICIAN ASSISTANT

## 2025-04-28 PROCEDURE — 3079F DIAST BP 80-89 MM HG: CPT | Performed by: PHYSICIAN ASSISTANT

## 2025-04-28 PROCEDURE — 3074F SYST BP LT 130 MM HG: CPT | Performed by: PHYSICIAN ASSISTANT

## 2025-04-28 RX ORDER — METOPROLOL SUCCINATE 25 MG/1
25 TABLET, EXTENDED RELEASE ORAL DAILY
Qty: 90 TABLET | Refills: 3 | Status: SHIPPED | OUTPATIENT
Start: 2025-04-28

## 2025-04-28 NOTE — LETTER
4/28/2025    Anyi Gracia MD  [address]  [facility] [address]    RE: Paulette Griffin       Dear Colleague,     I had the pleasure of seeing Paulette Griffin in the [facility].  Cardiology Clinic Progress Note  Paulette Griffin MRN# [id number]   YOB: 1955 Age: 69 year old         Primary Cardiologist: Dr. Alford      Assessment:     1.  PSVT- -Zio patch monitor showed average heart rate of 73, a 5 beat of NSVT (possibly SVT) and 3 runs of SVT with the longest being 5 beats.  No further recurrence, asymptomatic.  Continue Toprol 25 mg daily in the evening. A bit bradycardia but asymptomatic.     2. LBBB- Nuclear stress test in 2021 was negative for ischemia.     3. HTN- controlled on Toprol 25 mg daily         Plan:   - continue Metoprolol 25 mg daily   - follow up in 1 year, or sooner if new / worsening symptoms       Estephania Cervantes PA-C  [facility]        HPI:   Paulette Griffin is a very pleasant 69 year old female with PMH significant for LBBB, bilateral hip arthroplasty, and family history of blood disorder    She was seen in 2021 by Dr. Alford for evaluation of preoperative EKG showing left bundle branch block.  She underwent a nuclear stress test that was negative for inducible myocardial ischemia or infarction.  EF was normal at 70%.  She went on to have successful hip surgery.     In 2/29/2024 she presented for evaluation of rapid heartbeat noted on her Fitbit. She underwent an echocardiogram that showed a normal ejection fraction estimated at 60 to 65%, normal RV size and function, no valvular disease. She was noted to be significant hypertensive during her echocardiogram at 174/108. She was started on low-dose metoprolol succinate 25 mg daily. Follow-up Zio patch monitor showed primarily sinus rhythm with an average heart rate of 73. There was a 5 beat run of VT and 3 runs of SVT with the longest being 5 beats. 2% PAC burden.  "    Patient presents today for follow up in cardiology clinic and was last seen by Kia Eugene NP in 4/2024 at which time she was doing well on Metoprolol.     Today, patient reports she continues to do well. She takes Metoprolol in the evening. She has not had any cardiac symptoms whatsoever. She remains physically active and exercises 5 times a week (walking, elliptical, weights). She has an upcoming trip to [place] and [place] planned in May. She works in insurance.           ROS:   Please see pertinent positives in HPI      Medications:   Current Outpatient Medications   Medication Sig Dispense Refill     MELATONIN PO Take 3 mg by mouth nightly as needed.       metoprolol succinate ER (TOPROL XL) 25 MG 24 hr tablet Take 1 tablet (25 mg) by mouth daily. 90 tablet 0     multivitamin w/minerals (THERA-VIT-M) tablet Take 1 tablet by mouth daily           Physical Exam:     Vitals: /82 (BP Location: Right arm, Patient Position: Sitting, Cuff Size: Adult Regular)   Pulse 57   Ht 1.702 m (5' 7\")   Wt 86.7 kg (191 lb 3.2 oz)   LMP 08/31/2012   SpO2 99%   BMI 29.95 kg/m       Wt Readings from Last 4 Encounters:   03/31/25 86.6 kg (191 lb)   01/20/25 86.6 kg (191 lb)   04/29/24 83 kg (183 lb)   02/29/24 81.5 kg (179 lb 11.2 oz)       GEN: NAD  C/V:  Regular rate and rhythm, no murmur.    RESP: Respirations are unlabored. Clear to auscultation bilaterally without wheezing, rales, or rhonchi.  EXT: No LE edema.      Data:     Last lab work personally reviewed     Echo: 3/2024  Left ventricular size, global systolic function, and wall motion are normal,  estimated LVEF 60-65%.  Right ventricular global function is normal.  No significant valvular abnormalities.  The inferior vena cava was normal in size with preserved respiratory  variability.  Hypertensive for the study:  Start of test BP: 174/108. End of test 164/102. No symptoms reported.    Stress Test: 2021     The nuclear stress test is probably negative " for inducible myocardial ischemia or infarction.     Nuclear Study Quality: Small, distal anteroseptal defect, suspect this is secondary to left bundle branch block.  Less likely would be small area of infarct.     The left ventricular ejection fraction at stress is greater than 70%.     There is no prior study for comparison.    Cleo 3/2024  5-beat VT run. (Suspect SVT)  Occasional PACs ~2%.  No symptoms reported.        Thank you for allowing me to participate in the care of your patient.      Sincerely,     Estephania Cervantes PA-C     [facility]  cc:   Kia Soto, MORGAN CNP  [address] [place] [address]